# Patient Record
Sex: FEMALE | Race: WHITE | Employment: OTHER | URBAN - METROPOLITAN AREA
[De-identification: names, ages, dates, MRNs, and addresses within clinical notes are randomized per-mention and may not be internally consistent; named-entity substitution may affect disease eponyms.]

---

## 2017-01-17 RX ORDER — INSULIN DETEMIR 100 [IU]/ML
INJECTION, SOLUTION SUBCUTANEOUS
Qty: 45 ML | Refills: 3 | Status: SHIPPED | OUTPATIENT
Start: 2017-01-17 | End: 2018-01-18 | Stop reason: SDUPTHER

## 2017-02-06 RX ORDER — METOPROLOL TARTRATE 50 MG/1
TABLET, FILM COATED ORAL
Qty: 180 TABLET | Refills: 1 | Status: SHIPPED | OUTPATIENT
Start: 2017-02-06 | End: 2017-07-10 | Stop reason: SDUPTHER

## 2017-03-03 ENCOUNTER — TELEPHONE (OUTPATIENT)
Dept: FAMILY MEDICINE CLINIC | Age: 72
End: 2017-03-03

## 2017-04-05 DIAGNOSIS — I10 ESSENTIAL HYPERTENSION: ICD-10-CM

## 2017-04-05 DIAGNOSIS — E78.5 HYPERLIPIDEMIA: ICD-10-CM

## 2017-04-05 LAB
A/G RATIO: 1.4 (ref 1.1–2.2)
ALBUMIN SERPL-MCNC: 4.1 G/DL (ref 3.4–5)
ALP BLD-CCNC: 68 U/L (ref 40–129)
ALT SERPL-CCNC: 15 U/L (ref 10–40)
ANION GAP SERPL CALCULATED.3IONS-SCNC: 15 MMOL/L (ref 3–16)
AST SERPL-CCNC: 19 U/L (ref 15–37)
BILIRUB SERPL-MCNC: 0.4 MG/DL (ref 0–1)
BUN BLDV-MCNC: 14 MG/DL (ref 7–20)
CALCIUM SERPL-MCNC: 9.4 MG/DL (ref 8.3–10.6)
CHLORIDE BLD-SCNC: 100 MMOL/L (ref 99–110)
CHOLESTEROL, TOTAL: 127 MG/DL (ref 0–199)
CO2: 27 MMOL/L (ref 21–32)
CREAT SERPL-MCNC: 0.7 MG/DL (ref 0.6–1.2)
CREATININE URINE: 84.6 MG/DL (ref 28–259)
GFR AFRICAN AMERICAN: >60
GFR NON-AFRICAN AMERICAN: >60
GLOBULIN: 3 G/DL
GLUCOSE BLD-MCNC: 103 MG/DL (ref 70–99)
HDLC SERPL-MCNC: 42 MG/DL (ref 40–60)
LDL CHOLESTEROL CALCULATED: 60 MG/DL
MICROALBUMIN UR-MCNC: <1.2 MG/DL
MICROALBUMIN/CREAT UR-RTO: NORMAL MG/G (ref 0–30)
POTASSIUM SERPL-SCNC: 4.1 MMOL/L (ref 3.5–5.1)
SODIUM BLD-SCNC: 142 MMOL/L (ref 136–145)
TOTAL PROTEIN: 7.1 G/DL (ref 6.4–8.2)
TRIGL SERPL-MCNC: 127 MG/DL (ref 0–150)
VLDLC SERPL CALC-MCNC: 25 MG/DL

## 2017-04-06 LAB
ESTIMATED AVERAGE GLUCOSE: 157.1 MG/DL
HBA1C MFR BLD: 7.1 %

## 2017-04-11 ENCOUNTER — OFFICE VISIT (OUTPATIENT)
Dept: FAMILY MEDICINE CLINIC | Age: 72
End: 2017-04-11

## 2017-04-11 VITALS
DIASTOLIC BLOOD PRESSURE: 78 MMHG | TEMPERATURE: 98 F | BODY MASS INDEX: 29.02 KG/M2 | SYSTOLIC BLOOD PRESSURE: 130 MMHG | HEIGHT: 64 IN | HEART RATE: 68 BPM | WEIGHT: 170 LBS

## 2017-04-11 DIAGNOSIS — I10 ESSENTIAL HYPERTENSION: ICD-10-CM

## 2017-04-11 DIAGNOSIS — E78.00 PURE HYPERCHOLESTEROLEMIA: ICD-10-CM

## 2017-04-11 DIAGNOSIS — Z78.0 POSTMENOPAUSAL: ICD-10-CM

## 2017-04-11 PROCEDURE — 3014F SCREEN MAMMO DOC REV: CPT | Performed by: FAMILY MEDICINE

## 2017-04-11 PROCEDURE — 1036F TOBACCO NON-USER: CPT | Performed by: FAMILY MEDICINE

## 2017-04-11 PROCEDURE — 3045F PR MOST RECENT HEMOGLOBIN A1C LEVEL 7.0-9.0%: CPT | Performed by: FAMILY MEDICINE

## 2017-04-11 PROCEDURE — 1090F PRES/ABSN URINE INCON ASSESS: CPT | Performed by: FAMILY MEDICINE

## 2017-04-11 PROCEDURE — 1123F ACP DISCUSS/DSCN MKR DOCD: CPT | Performed by: FAMILY MEDICINE

## 2017-04-11 PROCEDURE — G8420 CALC BMI NORM PARAMETERS: HCPCS | Performed by: FAMILY MEDICINE

## 2017-04-11 PROCEDURE — G8427 DOCREV CUR MEDS BY ELIG CLIN: HCPCS | Performed by: FAMILY MEDICINE

## 2017-04-11 PROCEDURE — 4040F PNEUMOC VAC/ADMIN/RCVD: CPT | Performed by: FAMILY MEDICINE

## 2017-04-11 PROCEDURE — 99214 OFFICE O/P EST MOD 30 MIN: CPT | Performed by: FAMILY MEDICINE

## 2017-04-11 PROCEDURE — 3017F COLORECTAL CA SCREEN DOC REV: CPT | Performed by: FAMILY MEDICINE

## 2017-04-11 PROCEDURE — G8399 PT W/DXA RESULTS DOCUMENT: HCPCS | Performed by: FAMILY MEDICINE

## 2017-04-11 ASSESSMENT — ENCOUNTER SYMPTOMS
VOMITING: 0
WHEEZING: 0
COLOR CHANGE: 0
DIARRHEA: 0
SINUS PRESSURE: 0
RHINORRHEA: 0
EYE REDNESS: 0
ABDOMINAL PAIN: 0
BLOOD IN STOOL: 0
EYE DISCHARGE: 0
COUGH: 0
CHEST TIGHTNESS: 0
CONSTIPATION: 0
SHORTNESS OF BREATH: 0
NAUSEA: 0
EYE PAIN: 0

## 2017-04-11 ASSESSMENT — PATIENT HEALTH QUESTIONNAIRE - PHQ9
1. LITTLE INTEREST OR PLEASURE IN DOING THINGS: 0
SUM OF ALL RESPONSES TO PHQ QUESTIONS 1-9: 0
2. FEELING DOWN, DEPRESSED OR HOPELESS: 0
SUM OF ALL RESPONSES TO PHQ9 QUESTIONS 1 & 2: 0

## 2017-06-16 ENCOUNTER — HOSPITAL ENCOUNTER (OUTPATIENT)
Dept: GENERAL RADIOLOGY | Age: 72
Discharge: HOME | End: 2017-06-16
Attending: FAMILY MEDICINE | Admitting: FAMILY MEDICINE

## 2017-06-16 DIAGNOSIS — Z78.0 ASYMPTOMATIC MENOPAUSAL STATE: ICD-10-CM

## 2017-06-16 DIAGNOSIS — Z78.0 POSTMENOPAUSAL: ICD-10-CM

## 2017-06-16 DIAGNOSIS — Z12.31 VISIT FOR SCREENING MAMMOGRAM: ICD-10-CM

## 2017-07-10 RX ORDER — METOPROLOL TARTRATE 50 MG/1
TABLET, FILM COATED ORAL
Qty: 180 TABLET | Refills: 1 | Status: SHIPPED | OUTPATIENT
Start: 2017-07-10 | End: 2018-01-05 | Stop reason: SDUPTHER

## 2017-10-04 RX ORDER — ATORVASTATIN CALCIUM 10 MG/1
TABLET, FILM COATED ORAL
Qty: 90 TABLET | Refills: 2 | Status: SHIPPED | OUTPATIENT
Start: 2017-10-04 | End: 2018-07-23 | Stop reason: SDUPTHER

## 2017-10-07 DIAGNOSIS — E78.00 PURE HYPERCHOLESTEROLEMIA: ICD-10-CM

## 2017-10-07 DIAGNOSIS — I10 ESSENTIAL HYPERTENSION: ICD-10-CM

## 2017-10-07 LAB
A/G RATIO: 1.4 (ref 1.1–2.2)
ALBUMIN SERPL-MCNC: 4.1 G/DL (ref 3.4–5)
ALP BLD-CCNC: 68 U/L (ref 40–129)
ALT SERPL-CCNC: 16 U/L (ref 10–40)
ANION GAP SERPL CALCULATED.3IONS-SCNC: 12 MMOL/L (ref 3–16)
AST SERPL-CCNC: 16 U/L (ref 15–37)
BILIRUB SERPL-MCNC: 0.5 MG/DL (ref 0–1)
BUN BLDV-MCNC: 18 MG/DL (ref 7–20)
CALCIUM SERPL-MCNC: 9.4 MG/DL (ref 8.3–10.6)
CHLORIDE BLD-SCNC: 102 MMOL/L (ref 99–110)
CHOLESTEROL, TOTAL: 129 MG/DL (ref 0–199)
CO2: 29 MMOL/L (ref 21–32)
CREAT SERPL-MCNC: 0.7 MG/DL (ref 0.6–1.2)
GFR AFRICAN AMERICAN: >60
GFR NON-AFRICAN AMERICAN: >60
GLOBULIN: 2.9 G/DL
GLUCOSE BLD-MCNC: 116 MG/DL (ref 70–99)
HDLC SERPL-MCNC: 41 MG/DL (ref 40–60)
LDL CHOLESTEROL CALCULATED: 60 MG/DL
POTASSIUM SERPL-SCNC: 4.4 MMOL/L (ref 3.5–5.1)
SODIUM BLD-SCNC: 143 MMOL/L (ref 136–145)
TOTAL PROTEIN: 7 G/DL (ref 6.4–8.2)
TRIGL SERPL-MCNC: 139 MG/DL (ref 0–150)
VLDLC SERPL CALC-MCNC: 28 MG/DL

## 2017-10-08 LAB
ESTIMATED AVERAGE GLUCOSE: 165.7 MG/DL
HBA1C MFR BLD: 7.4 %

## 2017-10-12 ENCOUNTER — OFFICE VISIT (OUTPATIENT)
Dept: FAMILY MEDICINE CLINIC | Age: 72
End: 2017-10-12

## 2017-10-12 VITALS
TEMPERATURE: 98.2 F | DIASTOLIC BLOOD PRESSURE: 78 MMHG | HEART RATE: 64 BPM | BODY MASS INDEX: 30.93 KG/M2 | HEIGHT: 64 IN | SYSTOLIC BLOOD PRESSURE: 148 MMHG | WEIGHT: 181.2 LBS

## 2017-10-12 DIAGNOSIS — I10 ESSENTIAL HYPERTENSION: ICD-10-CM

## 2017-10-12 DIAGNOSIS — Z23 NEED FOR INFLUENZA VACCINATION: ICD-10-CM

## 2017-10-12 DIAGNOSIS — E78.00 PURE HYPERCHOLESTEROLEMIA: ICD-10-CM

## 2017-10-12 PROCEDURE — 90662 IIV NO PRSV INCREASED AG IM: CPT | Performed by: FAMILY MEDICINE

## 2017-10-12 PROCEDURE — 1036F TOBACCO NON-USER: CPT | Performed by: FAMILY MEDICINE

## 2017-10-12 PROCEDURE — G0008 ADMIN INFLUENZA VIRUS VAC: HCPCS | Performed by: FAMILY MEDICINE

## 2017-10-12 PROCEDURE — 1123F ACP DISCUSS/DSCN MKR DOCD: CPT | Performed by: FAMILY MEDICINE

## 2017-10-12 PROCEDURE — G8399 PT W/DXA RESULTS DOCUMENT: HCPCS | Performed by: FAMILY MEDICINE

## 2017-10-12 PROCEDURE — G8427 DOCREV CUR MEDS BY ELIG CLIN: HCPCS | Performed by: FAMILY MEDICINE

## 2017-10-12 PROCEDURE — 4040F PNEUMOC VAC/ADMIN/RCVD: CPT | Performed by: FAMILY MEDICINE

## 2017-10-12 PROCEDURE — G8484 FLU IMMUNIZE NO ADMIN: HCPCS | Performed by: FAMILY MEDICINE

## 2017-10-12 PROCEDURE — 3046F HEMOGLOBIN A1C LEVEL >9.0%: CPT | Performed by: FAMILY MEDICINE

## 2017-10-12 PROCEDURE — 3014F SCREEN MAMMO DOC REV: CPT | Performed by: FAMILY MEDICINE

## 2017-10-12 PROCEDURE — 3017F COLORECTAL CA SCREEN DOC REV: CPT | Performed by: FAMILY MEDICINE

## 2017-10-12 PROCEDURE — G8417 CALC BMI ABV UP PARAM F/U: HCPCS | Performed by: FAMILY MEDICINE

## 2017-10-12 PROCEDURE — 99214 OFFICE O/P EST MOD 30 MIN: CPT | Performed by: FAMILY MEDICINE

## 2017-10-12 PROCEDURE — 1090F PRES/ABSN URINE INCON ASSESS: CPT | Performed by: FAMILY MEDICINE

## 2017-10-12 ASSESSMENT — ENCOUNTER SYMPTOMS
EYE PAIN: 0
CHEST TIGHTNESS: 0
ABDOMINAL PAIN: 0
SHORTNESS OF BREATH: 0
EYE REDNESS: 0
NAUSEA: 0
COUGH: 0
CONSTIPATION: 0
VOMITING: 0
SINUS PRESSURE: 0
WHEEZING: 0
DIARRHEA: 0
EYE DISCHARGE: 0
RHINORRHEA: 0
BLOOD IN STOOL: 0
COLOR CHANGE: 0

## 2017-10-12 NOTE — PATIENT INSTRUCTIONS
Please call your pharmacy if you need any refills of your medication(s). Please call our office at [phone] if you don't hear from us about your test results. Bring an accurate list of your medications with you at every appointment to ensure that we have the correct information. Our office hours are: Monday - Friday 7 am- 5 pm; Saturdays vary on doctor working.     Phone lines turn on at 8 am.

## 2017-10-12 NOTE — PROGRESS NOTES
Hypertension     Type II or unspecified type diabetes mellitus without mention of complication, not stated as uncontrolled      Past Surgical History:   Procedure Laterality Date    COLONOSCOPY      FINGER TRIGGER RELEASE      thumb right hand    HYSTERECTOMY      SKIN GRAFT       Family History   Problem Relation Age of Onset    Diabetes Mother     Heart Disease Mother     Heart Disease Father     Cancer Brother      prostate    Heart Disease Brother     Heart Disease Brother     Heart Disease Brother     Hypertension Other      Social History   Substance Use Topics    Smoking status: Former Smoker     Packs/day: 1.00     Years: 20.00     Types: Cigarettes     Start date: 1/1/1965     Quit date: 4/5/1985    Smokeless tobacco: Never Used    Alcohol use No     Vitals:    10/12/17 0852 10/12/17 0906   BP: (!) 148/82 (!) 148/78   Site: Left Arm Right Arm   Position: Sitting Sitting   Cuff Size: Medium Adult Medium Adult   Pulse: 64    Temp: 98.2 °F (36.8 °C)    TempSrc: Oral    Weight: 181 lb 3.2 oz (82.2 kg)    Height: 5' 4\" (1.626 m)      Body mass index is 31.1 kg/m².      Wt Readings from Last 3 Encounters:   10/12/17 181 lb 3.2 oz (82.2 kg)   06/06/17 169 lb (76.7 kg)   04/11/17 170 lb (77.1 kg)     BP Readings from Last 3 Encounters:   10/12/17 (!) 148/78   06/06/17 (!) 142/86   04/11/17 130/78      Lab Review   Orders Only on 10/07/2017   Component Date Value    Cholesterol, Total 10/07/2017 129     Triglycerides 10/07/2017 139     HDL 10/07/2017 41     LDL Calculated 10/07/2017 60     VLDL CHOLESTEROL CALCULA* 10/07/2017 28     Sodium 10/07/2017 143     Potassium 10/07/2017 4.4     Chloride 10/07/2017 102     CO2 10/07/2017 29     Anion Gap 10/07/2017 12     Glucose 10/07/2017 116*    BUN 10/07/2017 18     CREATININE 10/07/2017 0.7     GFR Non- 10/07/2017 >60     GFR  10/07/2017 >60     Calcium 10/07/2017 9.4     Total Protein 10/07/2017 7.0     Alb 10/07/2017 4.1     Albumin/Globulin Ratio 10/07/2017 1.4     Total Bilirubin 10/07/2017 0.5     Alkaline Phosphatase 10/07/2017 68     ALT 10/07/2017 16     AST 10/07/2017 16     Globulin 10/07/2017 2.9     Hemoglobin A1C 10/08/2017 7.4     eAG 10/08/2017 165.7        Review of Systems   Constitutional: Negative for chills, fatigue, fever and unexpected weight change. HENT: Negative for congestion, ear discharge, ear pain, hearing loss, postnasal drip, rhinorrhea, sinus pressure and tinnitus. Eyes: Negative for pain, discharge, redness and visual disturbance. Respiratory: Negative for cough, chest tightness, shortness of breath and wheezing. Cardiovascular: Negative for chest pain and palpitations. Gastrointestinal: Negative for abdominal pain, blood in stool, constipation, diarrhea, nausea and vomiting. Genitourinary: Negative for difficulty urinating, dysuria and hematuria. Musculoskeletal: Negative for arthralgias and myalgias. Skin: Negative for color change and rash. Neurological: Negative for dizziness, seizures, syncope and headaches. Psychiatric/Behavioral: Negative for dysphoric mood and sleep disturbance. The patient is not nervous/anxious. Objective:   Physical Exam   Constitutional: She is oriented to person, place, and time. She appears well-developed and well-nourished. No distress. HENT:   Head: Normocephalic. Mouth/Throat: Oropharynx is clear and moist. No oropharyngeal exudate. Eyes: Conjunctivae and EOM are normal. No scleral icterus. Neck: Neck supple. No thyromegaly present. Cardiovascular: Normal rate, regular rhythm, normal heart sounds and intact distal pulses. No murmur heard. Pulmonary/Chest: Effort normal and breath sounds normal. She has no wheezes. Abdominal: Soft. Bowel sounds are normal. She exhibits no distension. There is no tenderness. There is no rebound and no guarding. Musculoskeletal: Normal range of motion.  She exhibits

## 2017-12-11 RX ORDER — LOSARTAN POTASSIUM 25 MG/1
TABLET ORAL
Qty: 135 TABLET | Refills: 3 | Status: SHIPPED | OUTPATIENT
Start: 2017-12-11 | End: 2018-07-23 | Stop reason: SDUPTHER

## 2018-01-09 RX ORDER — METOPROLOL TARTRATE 50 MG/1
TABLET, FILM COATED ORAL
Qty: 180 TABLET | Refills: 1 | Status: SHIPPED | OUTPATIENT
Start: 2018-01-09 | End: 2018-07-23 | Stop reason: SDUPTHER

## 2018-01-18 RX ORDER — INSULIN DETEMIR 100 [IU]/ML
INJECTION, SOLUTION SUBCUTANEOUS
Qty: 45 ML | Refills: 3 | Status: SHIPPED | OUTPATIENT
Start: 2018-01-18 | End: 2018-10-23 | Stop reason: SDUPTHER

## 2018-04-10 ENCOUNTER — OFFICE VISIT (OUTPATIENT)
Dept: FAMILY MEDICINE CLINIC | Age: 73
End: 2018-04-10

## 2018-04-10 VITALS
OXYGEN SATURATION: 95 % | SYSTOLIC BLOOD PRESSURE: 138 MMHG | DIASTOLIC BLOOD PRESSURE: 68 MMHG | BODY MASS INDEX: 32.61 KG/M2 | RESPIRATION RATE: 16 BRPM | HEIGHT: 64 IN | HEART RATE: 70 BPM | WEIGHT: 191 LBS

## 2018-04-10 DIAGNOSIS — Z85.42 HISTORY OF UTERINE CANCER: ICD-10-CM

## 2018-04-10 DIAGNOSIS — I10 ESSENTIAL HYPERTENSION: ICD-10-CM

## 2018-04-10 DIAGNOSIS — R06.09 DOE (DYSPNEA ON EXERTION): ICD-10-CM

## 2018-04-10 DIAGNOSIS — E78.00 PURE HYPERCHOLESTEROLEMIA: ICD-10-CM

## 2018-04-10 DIAGNOSIS — R06.09 DYSPNEA ON EXERTION: ICD-10-CM

## 2018-04-10 DIAGNOSIS — Z86.010 HISTORY OF COLONIC POLYPS: ICD-10-CM

## 2018-04-10 PROBLEM — Z86.0100 HISTORY OF COLONIC POLYPS: Status: ACTIVE | Noted: 2018-04-10

## 2018-04-10 LAB
ANION GAP SERPL CALCULATED.3IONS-SCNC: 16 MMOL/L (ref 3–16)
BUN BLDV-MCNC: 22 MG/DL (ref 7–20)
CALCIUM SERPL-MCNC: 9.7 MG/DL (ref 8.3–10.6)
CHLORIDE BLD-SCNC: 98 MMOL/L (ref 99–110)
CO2: 25 MMOL/L (ref 21–32)
CREAT SERPL-MCNC: 0.7 MG/DL (ref 0.6–1.2)
CREATININE URINE: 105 MG/DL (ref 28–259)
GFR AFRICAN AMERICAN: >60
GFR NON-AFRICAN AMERICAN: >60
GLUCOSE BLD-MCNC: 148 MG/DL (ref 70–99)
HBA1C MFR BLD: 9.4 %
MICROALBUMIN UR-MCNC: <1.2 MG/DL
MICROALBUMIN/CREAT UR-RTO: NORMAL MG/G (ref 0–30)
POTASSIUM SERPL-SCNC: 4.8 MMOL/L (ref 3.5–5.1)
SODIUM BLD-SCNC: 139 MMOL/L (ref 136–145)

## 2018-04-10 PROCEDURE — 3046F HEMOGLOBIN A1C LEVEL >9.0%: CPT | Performed by: INTERNAL MEDICINE

## 2018-04-10 PROCEDURE — G8417 CALC BMI ABV UP PARAM F/U: HCPCS | Performed by: INTERNAL MEDICINE

## 2018-04-10 PROCEDURE — 83036 HEMOGLOBIN GLYCOSYLATED A1C: CPT | Performed by: INTERNAL MEDICINE

## 2018-04-10 PROCEDURE — G8399 PT W/DXA RESULTS DOCUMENT: HCPCS | Performed by: INTERNAL MEDICINE

## 2018-04-10 PROCEDURE — 36415 COLL VENOUS BLD VENIPUNCTURE: CPT | Performed by: INTERNAL MEDICINE

## 2018-04-10 PROCEDURE — 1036F TOBACCO NON-USER: CPT | Performed by: INTERNAL MEDICINE

## 2018-04-10 PROCEDURE — 1090F PRES/ABSN URINE INCON ASSESS: CPT | Performed by: INTERNAL MEDICINE

## 2018-04-10 PROCEDURE — 4040F PNEUMOC VAC/ADMIN/RCVD: CPT | Performed by: INTERNAL MEDICINE

## 2018-04-10 PROCEDURE — 3017F COLORECTAL CA SCREEN DOC REV: CPT | Performed by: INTERNAL MEDICINE

## 2018-04-10 PROCEDURE — 3014F SCREEN MAMMO DOC REV: CPT | Performed by: INTERNAL MEDICINE

## 2018-04-10 PROCEDURE — G8427 DOCREV CUR MEDS BY ELIG CLIN: HCPCS | Performed by: INTERNAL MEDICINE

## 2018-04-10 PROCEDURE — 1123F ACP DISCUSS/DSCN MKR DOCD: CPT | Performed by: INTERNAL MEDICINE

## 2018-04-10 PROCEDURE — 99214 OFFICE O/P EST MOD 30 MIN: CPT | Performed by: INTERNAL MEDICINE

## 2018-04-10 RX ORDER — GLIPIZIDE 10 MG/1
TABLET, FILM COATED, EXTENDED RELEASE ORAL 2 TIMES DAILY
COMMUNITY
Start: 2016-09-26 | End: 2018-07-23 | Stop reason: SDUPTHER

## 2018-04-10 RX ORDER — GLUCOSAMINE HCL/CHONDROITIN SU 500-400 MG
CAPSULE ORAL
Qty: 100 STRIP | Refills: 3 | Status: SHIPPED | OUTPATIENT
Start: 2018-04-10 | End: 2018-04-12 | Stop reason: SDUPTHER

## 2018-04-10 RX ORDER — LANCETS
EACH MISCELLANEOUS
Qty: 100 EACH | Refills: 3 | Status: SHIPPED | OUTPATIENT
Start: 2018-04-10

## 2018-04-12 ENCOUNTER — TELEPHONE (OUTPATIENT)
Dept: FAMILY MEDICINE CLINIC | Age: 73
End: 2018-04-12

## 2018-04-12 RX ORDER — GLUCOSAMINE HCL/CHONDROITIN SU 500-400 MG
CAPSULE ORAL
Qty: 100 STRIP | Refills: 3 | Status: SHIPPED | OUTPATIENT
Start: 2018-04-12 | End: 2018-04-16 | Stop reason: SDUPTHER

## 2018-04-12 RX ORDER — LANCETS 30 GAUGE
1 EACH MISCELLANEOUS DAILY
Qty: 100 EACH | Refills: 3 | Status: SHIPPED | OUTPATIENT
Start: 2018-04-12 | End: 2018-04-18 | Stop reason: SDUPTHER

## 2018-04-16 ENCOUNTER — TELEPHONE (OUTPATIENT)
Dept: FAMILY MEDICINE CLINIC | Age: 73
End: 2018-04-16

## 2018-04-16 RX ORDER — GLUCOSAMINE HCL/CHONDROITIN SU 500-400 MG
CAPSULE ORAL
Qty: 100 STRIP | Refills: 3 | Status: SHIPPED | OUTPATIENT
Start: 2018-04-16 | End: 2018-04-18 | Stop reason: SDUPTHER

## 2018-04-18 ENCOUNTER — TELEPHONE (OUTPATIENT)
Dept: FAMILY MEDICINE CLINIC | Age: 73
End: 2018-04-18

## 2018-04-18 RX ORDER — LANCETS 30 GAUGE
1 EACH MISCELLANEOUS DAILY
Qty: 100 EACH | Refills: 3 | Status: SHIPPED | OUTPATIENT
Start: 2018-04-18

## 2018-04-18 RX ORDER — GLUCOSAMINE HCL/CHONDROITIN SU 500-400 MG
CAPSULE ORAL
Qty: 100 STRIP | Refills: 3 | Status: SHIPPED | OUTPATIENT
Start: 2018-04-18 | End: 2019-10-29 | Stop reason: SDUPTHER

## 2018-05-23 ENCOUNTER — OFFICE VISIT (OUTPATIENT)
Dept: FAMILY MEDICINE CLINIC | Age: 73
End: 2018-05-23

## 2018-05-23 VITALS
RESPIRATION RATE: 16 BRPM | HEIGHT: 64 IN | OXYGEN SATURATION: 96 % | BODY MASS INDEX: 32.78 KG/M2 | WEIGHT: 192 LBS | DIASTOLIC BLOOD PRESSURE: 80 MMHG | SYSTOLIC BLOOD PRESSURE: 140 MMHG | HEART RATE: 62 BPM

## 2018-05-23 DIAGNOSIS — E78.00 PURE HYPERCHOLESTEROLEMIA: ICD-10-CM

## 2018-05-23 DIAGNOSIS — I10 ESSENTIAL HYPERTENSION: ICD-10-CM

## 2018-05-23 LAB — HBA1C MFR BLD: 8.2 %

## 2018-05-23 PROCEDURE — 1123F ACP DISCUSS/DSCN MKR DOCD: CPT | Performed by: INTERNAL MEDICINE

## 2018-05-23 PROCEDURE — 2022F DILAT RTA XM EVC RTNOPTHY: CPT | Performed by: INTERNAL MEDICINE

## 2018-05-23 PROCEDURE — 4040F PNEUMOC VAC/ADMIN/RCVD: CPT | Performed by: INTERNAL MEDICINE

## 2018-05-23 PROCEDURE — 1036F TOBACCO NON-USER: CPT | Performed by: INTERNAL MEDICINE

## 2018-05-23 PROCEDURE — G8427 DOCREV CUR MEDS BY ELIG CLIN: HCPCS | Performed by: INTERNAL MEDICINE

## 2018-05-23 PROCEDURE — 3288F FALL RISK ASSESSMENT DOCD: CPT | Performed by: INTERNAL MEDICINE

## 2018-05-23 PROCEDURE — 3017F COLORECTAL CA SCREEN DOC REV: CPT | Performed by: INTERNAL MEDICINE

## 2018-05-23 PROCEDURE — G8417 CALC BMI ABV UP PARAM F/U: HCPCS | Performed by: INTERNAL MEDICINE

## 2018-05-23 PROCEDURE — 83036 HEMOGLOBIN GLYCOSYLATED A1C: CPT | Performed by: INTERNAL MEDICINE

## 2018-05-23 PROCEDURE — 1090F PRES/ABSN URINE INCON ASSESS: CPT | Performed by: INTERNAL MEDICINE

## 2018-05-23 PROCEDURE — G8399 PT W/DXA RESULTS DOCUMENT: HCPCS | Performed by: INTERNAL MEDICINE

## 2018-05-23 PROCEDURE — 99213 OFFICE O/P EST LOW 20 MIN: CPT | Performed by: INTERNAL MEDICINE

## 2018-05-23 PROCEDURE — 3045F PR MOST RECENT HEMOGLOBIN A1C LEVEL 7.0-9.0%: CPT | Performed by: INTERNAL MEDICINE

## 2018-05-23 PROCEDURE — G8510 SCR DEP NEG, NO PLAN REQD: HCPCS | Performed by: INTERNAL MEDICINE

## 2018-05-23 ASSESSMENT — PATIENT HEALTH QUESTIONNAIRE - PHQ9
2. FEELING DOWN, DEPRESSED OR HOPELESS: 0
SUM OF ALL RESPONSES TO PHQ9 QUESTIONS 1 & 2: 0
1. LITTLE INTEREST OR PLEASURE IN DOING THINGS: 0
SUM OF ALL RESPONSES TO PHQ QUESTIONS 1-9: 0

## 2018-07-09 ENCOUNTER — HOSPITAL ENCOUNTER (OUTPATIENT)
Dept: MAMMOGRAPHY | Age: 73
Discharge: HOME | End: 2018-07-09
Attending: INTERNAL MEDICINE | Admitting: INTERNAL MEDICINE

## 2018-07-09 DIAGNOSIS — Z78.0 ASYMPTOMATIC MENOPAUSAL STATE: ICD-10-CM

## 2018-07-09 DIAGNOSIS — Z12.31 ENCOUNTER FOR SCREENING MAMMOGRAM FOR BREAST CANCER: ICD-10-CM

## 2018-07-23 ENCOUNTER — OFFICE VISIT (OUTPATIENT)
Dept: FAMILY MEDICINE CLINIC | Age: 73
End: 2018-07-23

## 2018-07-23 VITALS
WEIGHT: 191 LBS | DIASTOLIC BLOOD PRESSURE: 78 MMHG | HEART RATE: 66 BPM | HEIGHT: 64 IN | BODY MASS INDEX: 32.61 KG/M2 | SYSTOLIC BLOOD PRESSURE: 145 MMHG

## 2018-07-23 DIAGNOSIS — I10 ESSENTIAL HYPERTENSION: ICD-10-CM

## 2018-07-23 DIAGNOSIS — R94.2 ABNORMAL PFT: ICD-10-CM

## 2018-07-23 LAB — HBA1C MFR BLD: 7.5 %

## 2018-07-23 PROCEDURE — 1090F PRES/ABSN URINE INCON ASSESS: CPT | Performed by: INTERNAL MEDICINE

## 2018-07-23 PROCEDURE — 3017F COLORECTAL CA SCREEN DOC REV: CPT | Performed by: INTERNAL MEDICINE

## 2018-07-23 PROCEDURE — G8399 PT W/DXA RESULTS DOCUMENT: HCPCS | Performed by: INTERNAL MEDICINE

## 2018-07-23 PROCEDURE — 83036 HEMOGLOBIN GLYCOSYLATED A1C: CPT | Performed by: INTERNAL MEDICINE

## 2018-07-23 PROCEDURE — G8417 CALC BMI ABV UP PARAM F/U: HCPCS | Performed by: INTERNAL MEDICINE

## 2018-07-23 PROCEDURE — 2022F DILAT RTA XM EVC RTNOPTHY: CPT | Performed by: INTERNAL MEDICINE

## 2018-07-23 PROCEDURE — 3045F PR MOST RECENT HEMOGLOBIN A1C LEVEL 7.0-9.0%: CPT | Performed by: INTERNAL MEDICINE

## 2018-07-23 PROCEDURE — 4040F PNEUMOC VAC/ADMIN/RCVD: CPT | Performed by: INTERNAL MEDICINE

## 2018-07-23 PROCEDURE — 99213 OFFICE O/P EST LOW 20 MIN: CPT | Performed by: INTERNAL MEDICINE

## 2018-07-23 PROCEDURE — 1101F PT FALLS ASSESS-DOCD LE1/YR: CPT | Performed by: INTERNAL MEDICINE

## 2018-07-23 PROCEDURE — 1123F ACP DISCUSS/DSCN MKR DOCD: CPT | Performed by: INTERNAL MEDICINE

## 2018-07-23 PROCEDURE — G8427 DOCREV CUR MEDS BY ELIG CLIN: HCPCS | Performed by: INTERNAL MEDICINE

## 2018-07-23 PROCEDURE — 1036F TOBACCO NON-USER: CPT | Performed by: INTERNAL MEDICINE

## 2018-07-23 RX ORDER — LOSARTAN POTASSIUM 25 MG/1
TABLET ORAL
Qty: 180 TABLET | Refills: 1 | Status: SHIPPED | OUTPATIENT
Start: 2018-07-23 | End: 2018-10-23 | Stop reason: SDUPTHER

## 2018-07-23 RX ORDER — METOPROLOL TARTRATE 50 MG/1
TABLET, FILM COATED ORAL
Qty: 180 TABLET | Refills: 1 | Status: SHIPPED | OUTPATIENT
Start: 2018-07-23 | End: 2018-10-23 | Stop reason: SDUPTHER

## 2018-07-23 RX ORDER — ATORVASTATIN CALCIUM 10 MG/1
TABLET, FILM COATED ORAL
Qty: 90 TABLET | Refills: 1 | Status: SHIPPED | OUTPATIENT
Start: 2018-07-23 | End: 2018-10-23 | Stop reason: SDUPTHER

## 2018-07-23 RX ORDER — GLIPIZIDE 10 MG/1
TABLET, FILM COATED, EXTENDED RELEASE ORAL
Qty: 180 TABLET | Refills: 1 | Status: SHIPPED | OUTPATIENT
Start: 2018-07-23 | End: 2018-10-23 | Stop reason: SDUPTHER
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
## 2018-10-23 ENCOUNTER — OFFICE VISIT (OUTPATIENT)
Dept: FAMILY MEDICINE CLINIC | Age: 73
End: 2018-10-23
Payer: MEDICARE

## 2018-10-23 VITALS
OXYGEN SATURATION: 94 % | BODY MASS INDEX: 33.12 KG/M2 | HEIGHT: 64 IN | DIASTOLIC BLOOD PRESSURE: 76 MMHG | SYSTOLIC BLOOD PRESSURE: 130 MMHG | HEART RATE: 70 BPM | RESPIRATION RATE: 16 BRPM | WEIGHT: 194 LBS

## 2018-10-23 DIAGNOSIS — Z85.42 HISTORY OF UTERINE CANCER: ICD-10-CM

## 2018-10-23 DIAGNOSIS — Z23 NEED FOR INFLUENZA VACCINATION: ICD-10-CM

## 2018-10-23 DIAGNOSIS — E78.00 PURE HYPERCHOLESTEROLEMIA: ICD-10-CM

## 2018-10-23 DIAGNOSIS — E66.9 OBESITY, CLASS I, BMI 30-34.9: ICD-10-CM

## 2018-10-23 DIAGNOSIS — Z86.010 HISTORY OF COLONIC POLYPS: ICD-10-CM

## 2018-10-23 DIAGNOSIS — Z92.3 HISTORY OF RADIATION THERAPY: ICD-10-CM

## 2018-10-23 DIAGNOSIS — E11.65 UNCONTROLLED TYPE 2 DIABETES MELLITUS WITH HYPERGLYCEMIA (HCC): Primary | ICD-10-CM

## 2018-10-23 DIAGNOSIS — I10 ESSENTIAL HYPERTENSION: ICD-10-CM

## 2018-10-23 PROBLEM — E66.811 OBESITY, CLASS I, BMI 30-34.9: Status: ACTIVE | Noted: 2018-10-23

## 2018-10-23 LAB
ALBUMIN SERPL-MCNC: 4.3 G/DL (ref 3.4–5)
ALP BLD-CCNC: 71 U/L (ref 40–129)
ALT SERPL-CCNC: 35 U/L (ref 10–40)
ANION GAP SERPL CALCULATED.3IONS-SCNC: 21 MMOL/L (ref 3–16)
AST SERPL-CCNC: 45 U/L (ref 15–37)
BILIRUB SERPL-MCNC: 0.6 MG/DL (ref 0–1)
BILIRUBIN DIRECT: <0.2 MG/DL (ref 0–0.3)
BILIRUBIN, INDIRECT: ABNORMAL MG/DL (ref 0–1)
BUN BLDV-MCNC: 14 MG/DL (ref 7–20)
CALCIUM SERPL-MCNC: 9.3 MG/DL (ref 8.3–10.6)
CHLORIDE BLD-SCNC: 101 MMOL/L (ref 99–110)
CHOLESTEROL, TOTAL: 126 MG/DL (ref 0–199)
CO2: 20 MMOL/L (ref 21–32)
CREAT SERPL-MCNC: 0.8 MG/DL (ref 0.6–1.2)
GFR AFRICAN AMERICAN: >60
GFR NON-AFRICAN AMERICAN: >60
GLUCOSE BLD-MCNC: 136 MG/DL (ref 70–99)
HBA1C MFR BLD: 7.9 %
HDLC SERPL-MCNC: 35 MG/DL (ref 40–60)
LDL CHOLESTEROL CALCULATED: 52 MG/DL
POTASSIUM SERPL-SCNC: 4.3 MMOL/L (ref 3.5–5.1)
SODIUM BLD-SCNC: 142 MMOL/L (ref 136–145)
TOTAL PROTEIN: 7.5 G/DL (ref 6.4–8.2)
TRIGL SERPL-MCNC: 196 MG/DL (ref 0–150)
VLDLC SERPL CALC-MCNC: 39 MG/DL

## 2018-10-23 PROCEDURE — 1090F PRES/ABSN URINE INCON ASSESS: CPT | Performed by: INTERNAL MEDICINE

## 2018-10-23 PROCEDURE — G8399 PT W/DXA RESULTS DOCUMENT: HCPCS | Performed by: INTERNAL MEDICINE

## 2018-10-23 PROCEDURE — 1101F PT FALLS ASSESS-DOCD LE1/YR: CPT | Performed by: INTERNAL MEDICINE

## 2018-10-23 PROCEDURE — G0008 ADMIN INFLUENZA VIRUS VAC: HCPCS | Performed by: INTERNAL MEDICINE

## 2018-10-23 PROCEDURE — 3045F PR MOST RECENT HEMOGLOBIN A1C LEVEL 7.0-9.0%: CPT | Performed by: INTERNAL MEDICINE

## 2018-10-23 PROCEDURE — G8417 CALC BMI ABV UP PARAM F/U: HCPCS | Performed by: INTERNAL MEDICINE

## 2018-10-23 PROCEDURE — G8482 FLU IMMUNIZE ORDER/ADMIN: HCPCS | Performed by: INTERNAL MEDICINE

## 2018-10-23 PROCEDURE — 2022F DILAT RTA XM EVC RTNOPTHY: CPT | Performed by: INTERNAL MEDICINE

## 2018-10-23 PROCEDURE — 90682 RIV4 VACC RECOMBINANT DNA IM: CPT | Performed by: INTERNAL MEDICINE

## 2018-10-23 PROCEDURE — 3017F COLORECTAL CA SCREEN DOC REV: CPT | Performed by: INTERNAL MEDICINE

## 2018-10-23 PROCEDURE — 99214 OFFICE O/P EST MOD 30 MIN: CPT | Performed by: INTERNAL MEDICINE

## 2018-10-23 PROCEDURE — 83036 HEMOGLOBIN GLYCOSYLATED A1C: CPT | Performed by: INTERNAL MEDICINE

## 2018-10-23 PROCEDURE — 36415 COLL VENOUS BLD VENIPUNCTURE: CPT | Performed by: INTERNAL MEDICINE

## 2018-10-23 PROCEDURE — G8427 DOCREV CUR MEDS BY ELIG CLIN: HCPCS | Performed by: INTERNAL MEDICINE

## 2018-10-23 PROCEDURE — 4040F PNEUMOC VAC/ADMIN/RCVD: CPT | Performed by: INTERNAL MEDICINE

## 2018-10-23 PROCEDURE — 1036F TOBACCO NON-USER: CPT | Performed by: INTERNAL MEDICINE

## 2018-10-23 PROCEDURE — 1123F ACP DISCUSS/DSCN MKR DOCD: CPT | Performed by: INTERNAL MEDICINE

## 2018-10-23 RX ORDER — ATORVASTATIN CALCIUM 10 MG/1
TABLET, FILM COATED ORAL
Qty: 90 TABLET | Refills: 1 | Status: SHIPPED | OUTPATIENT
Start: 2018-10-23 | End: 2019-07-08 | Stop reason: SDUPTHER

## 2018-10-23 RX ORDER — ASPIRIN 81 MG/1
81 TABLET ORAL DAILY
Qty: 90 TABLET | Refills: 1 | Status: SHIPPED | OUTPATIENT
Start: 2018-10-23 | End: 2020-07-01 | Stop reason: SDUPTHER

## 2018-10-23 RX ORDER — LOSARTAN POTASSIUM 25 MG/1
TABLET ORAL
Qty: 180 TABLET | Refills: 1 | Status: SHIPPED | OUTPATIENT
Start: 2018-10-23 | End: 2019-04-11 | Stop reason: SDUPTHER

## 2018-10-23 RX ORDER — GLIPIZIDE 10 MG/1
TABLET, FILM COATED, EXTENDED RELEASE ORAL
Qty: 180 TABLET | Refills: 1 | Status: SHIPPED | OUTPATIENT
Start: 2018-10-23 | End: 2019-05-23

## 2018-10-23 RX ORDER — METOPROLOL TARTRATE 50 MG/1
TABLET, FILM COATED ORAL
Qty: 180 TABLET | Refills: 1 | Status: SHIPPED | OUTPATIENT
Start: 2018-10-23 | End: 2019-07-08 | Stop reason: SDUPTHER

## 2018-10-24 ENCOUNTER — TELEPHONE (OUTPATIENT)
Dept: FAMILY MEDICINE CLINIC | Age: 73
End: 2018-10-24

## 2018-10-30 ENCOUNTER — TELEPHONE (OUTPATIENT)
Dept: FAMILY MEDICINE CLINIC | Age: 73
End: 2018-10-30

## 2018-10-30 NOTE — TELEPHONE ENCOUNTER
Pt called in stating that she was supposed to come in for a blood pressure check however she states that her blood pressure has been better and at the most recent reading it was 124/69. Best call back number for pt: [phone].

## 2019-04-11 RX ORDER — INSULIN DETEMIR 100 [IU]/ML
INJECTION, SOLUTION SUBCUTANEOUS
Qty: 45 ML | Refills: 3 | Status: SHIPPED | OUTPATIENT
Start: 2019-04-11 | End: 2019-07-23

## 2019-04-11 RX ORDER — LOSARTAN POTASSIUM 25 MG/1
TABLET ORAL
Qty: 180 TABLET | Refills: 1 | Status: SHIPPED | OUTPATIENT
Start: 2019-04-11 | End: 2019-09-24 | Stop reason: SDUPTHER

## 2019-04-23 ENCOUNTER — OFFICE VISIT (OUTPATIENT)
Dept: FAMILY MEDICINE CLINIC | Age: 74
End: 2019-04-23
Payer: MEDICARE

## 2019-04-23 VITALS
HEART RATE: 67 BPM | RESPIRATION RATE: 12 BRPM | WEIGHT: 193 LBS | SYSTOLIC BLOOD PRESSURE: 137 MMHG | OXYGEN SATURATION: 95 % | HEIGHT: 64 IN | DIASTOLIC BLOOD PRESSURE: 77 MMHG | BODY MASS INDEX: 32.95 KG/M2

## 2019-04-23 DIAGNOSIS — Z86.010 HISTORY OF COLONIC POLYPS: ICD-10-CM

## 2019-04-23 DIAGNOSIS — Z92.3 HISTORY OF RADIATION THERAPY: ICD-10-CM

## 2019-04-23 DIAGNOSIS — E11.65 UNCONTROLLED TYPE 2 DIABETES MELLITUS WITH HYPERGLYCEMIA (HCC): Primary | ICD-10-CM

## 2019-04-23 DIAGNOSIS — E78.00 PURE HYPERCHOLESTEROLEMIA: ICD-10-CM

## 2019-04-23 DIAGNOSIS — I10 ESSENTIAL HYPERTENSION: ICD-10-CM

## 2019-04-23 DIAGNOSIS — Z85.42 HISTORY OF UTERINE CANCER: ICD-10-CM

## 2019-04-23 LAB
ANION GAP SERPL CALCULATED.3IONS-SCNC: 20 MMOL/L (ref 3–16)
BUN BLDV-MCNC: 19 MG/DL (ref 7–20)
CALCIUM SERPL-MCNC: 9.8 MG/DL (ref 8.3–10.6)
CHLORIDE BLD-SCNC: 101 MMOL/L (ref 99–110)
CO2: 21 MMOL/L (ref 21–32)
CREAT SERPL-MCNC: 0.9 MG/DL (ref 0.6–1.2)
CREATININE URINE: 226.4 MG/DL (ref 28–259)
GFR AFRICAN AMERICAN: >60
GFR NON-AFRICAN AMERICAN: >60
GLUCOSE BLD-MCNC: 171 MG/DL (ref 70–99)
HBA1C MFR BLD: 8.5 %
MICROALBUMIN UR-MCNC: <1.2 MG/DL
MICROALBUMIN/CREAT UR-RTO: NORMAL MG/G (ref 0–30)
POTASSIUM SERPL-SCNC: 5 MMOL/L (ref 3.5–5.1)
SODIUM BLD-SCNC: 142 MMOL/L (ref 136–145)

## 2019-04-23 PROCEDURE — 3045F PR MOST RECENT HEMOGLOBIN A1C LEVEL 7.0-9.0%: CPT | Performed by: INTERNAL MEDICINE

## 2019-04-23 PROCEDURE — 3017F COLORECTAL CA SCREEN DOC REV: CPT | Performed by: INTERNAL MEDICINE

## 2019-04-23 PROCEDURE — 90732 PPSV23 VACC 2 YRS+ SUBQ/IM: CPT | Performed by: INTERNAL MEDICINE

## 2019-04-23 PROCEDURE — 36415 COLL VENOUS BLD VENIPUNCTURE: CPT | Performed by: INTERNAL MEDICINE

## 2019-04-23 PROCEDURE — G0009 ADMIN PNEUMOCOCCAL VACCINE: HCPCS | Performed by: INTERNAL MEDICINE

## 2019-04-23 PROCEDURE — 4040F PNEUMOC VAC/ADMIN/RCVD: CPT | Performed by: INTERNAL MEDICINE

## 2019-04-23 PROCEDURE — 99213 OFFICE O/P EST LOW 20 MIN: CPT | Performed by: INTERNAL MEDICINE

## 2019-04-23 PROCEDURE — G8399 PT W/DXA RESULTS DOCUMENT: HCPCS | Performed by: INTERNAL MEDICINE

## 2019-04-23 PROCEDURE — G8417 CALC BMI ABV UP PARAM F/U: HCPCS | Performed by: INTERNAL MEDICINE

## 2019-04-23 PROCEDURE — G8427 DOCREV CUR MEDS BY ELIG CLIN: HCPCS | Performed by: INTERNAL MEDICINE

## 2019-04-23 PROCEDURE — 1036F TOBACCO NON-USER: CPT | Performed by: INTERNAL MEDICINE

## 2019-04-23 PROCEDURE — 2022F DILAT RTA XM EVC RTNOPTHY: CPT | Performed by: INTERNAL MEDICINE

## 2019-04-23 PROCEDURE — 1090F PRES/ABSN URINE INCON ASSESS: CPT | Performed by: INTERNAL MEDICINE

## 2019-04-23 PROCEDURE — 83036 HEMOGLOBIN GLYCOSYLATED A1C: CPT | Performed by: INTERNAL MEDICINE

## 2019-04-23 PROCEDURE — 1123F ACP DISCUSS/DSCN MKR DOCD: CPT | Performed by: INTERNAL MEDICINE

## 2019-04-23 ASSESSMENT — PATIENT HEALTH QUESTIONNAIRE - PHQ9
SUM OF ALL RESPONSES TO PHQ QUESTIONS 1-9: 0
2. FEELING DOWN, DEPRESSED OR HOPELESS: 0
1. LITTLE INTEREST OR PLEASURE IN DOING THINGS: 0
SUM OF ALL RESPONSES TO PHQ9 QUESTIONS 1 & 2: 0
SUM OF ALL RESPONSES TO PHQ QUESTIONS 1-9: 0

## 2019-04-23 NOTE — LETTER
[address] [facility] [address] [address]  Phone: [phone]  Fax: [phone]    Opal Pena MD        April 24, 2019    [address] [facility] [address] [facility] [address]      Dear Mary Spotted:    I'm sorry I am unable to reach you. Your urine test is normal, good. Sugar is up a bit as we knew. Were you able to get your new medication? If too expensive, we can switch glipizide to short acting before meals. If you have any questions or concerns, please don't hesitate to call.     Sincerely,        Opal Pena MD

## 2019-04-23 NOTE — PATIENT INSTRUCTIONS
Stop glipizide and start new med. One a day x 2 weeks then twice daily.  Decrease calories and find an exercise you can do  Follow up with sugars in about 1 month with sugar log  If sugars consistently above 160 can increase insulin by 2 units  Schedule [facility] eye exam

## 2019-04-23 NOTE — PROGRESS NOTES
HPI: Yue Roy presents for evaluation and management of diabetes, obesity, hypertension, .     Chronic health issues include obesity, hypertension, history uterine cancer, diabetes     Uncontrolled DM 2. \"trying to follow diet\"  Walking and pedaling daily until back from [facility] April 2.  . States she is compliant with her medicines and trying to eat well. Doesn't have a explanation for her sugars being higher. Did have one episode of low sugar in winter. States sugars currently can be from 122- 217. No proteinuria. Losartan with blood pressures in the 140 range at home. Is up-to-date with pneumonia vaccine. No retinopathy 9.2018. Nocturia 1-2. No falls. No paresthesias.     Hypertension stress echo negative in 2013. Ejection fraction 55% with left ventricular hypertrophy. Pulmonary function with restrictive disease and CT with interstitial lung disease in the dependent portions. Negative laboratories for connective tissue disease. Swallow without aspiration. Denies sleep apnea. No increased shortness of breath or cough. No inhalers. Breathing is stable       uterine cancer. Total hysterectomy and radiation therapy follows up yearly with oncology.     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 Follows yearly. Positive callus     Colonic polyp. Recheck 2020. No rectal bleeding or change in stools.     Stable shortness of breath. No change. History restrictive lung disease. Obesity. No evidence of aspiration. No medication at this time.        Specialist  [facility]    Podiatrist    Oncologist.         PMH:    Total hyst for cancer     Partial amputation foot     SH: no tobacco. No alcohol.  33 years. Retired [employer] ran [employer]. Travels to [facility] late October till April. [address], fishing, gardening.     FH: 7 brothers 2 sisters    + brother colon cancer 79,     - DM, CAD      review of systems: Up-to-date with eye exams. Denies any hearing loss or falls. No sleep apnea. No current sinus symptoms. Denies any wheezing pneumonias. Does have shortness of breath with exertion and findings noted above. No chest pain palpitations. Blood pressure been good. Rare GE reflux. Occasional constipation. History of proteinuria. Occasionally urine fields and smells strong. No vaginal bleeding. Denies any joint issues other than missing foot. No falls. Has some difficulty walking secondary to discomfort.  Weight is up      Constitutional, ent, CV, respiratory, GI, , joint, skin, allergic and psychiatric ROS reviewed and negative except for above    No Known Allergies    Outpatient Medications Marked as Taking for the 4/23/19 encounter (Office Visit) with Abigail Barros MD   Medication Sig Dispense Refill    losartan (COZAAR) 25 MG tablet TAKE 1 TABLET TWICE A DAY  FOR BLOOD PRESSURE 180 tablet 1    LEVEMIR FLEXTOUCH 100 UNIT/ML injection pen INJECT 40 UNITS INTO THE   SKIN NIGHTLY 45 mL 3    glipiZIDE (GLUCOTROL XL) 10 MG extended release tablet 1 po bid 180 tablet 1    metoprolol tartrate (LOPRESSOR) 50 MG tablet TAKE 1 TABLET TWICE A  tablet 1    atorvastatin (LIPITOR) 10 MG tablet TAKE 1 TABLET DAILY 90 tablet 1    metFORMIN (GLUCOPHAGE) 850 MG tablet TAKE 1 TABLET TWICE A DAY  WITH MEALS 180 tablet 1    aspirin 81 MG EC tablet Take 1 tablet by mouth daily 90 tablet 1    Blood Glucose Monitoring Suppl BHANU Disp 1  Also lancets and strips for bid testing 100 RF x3 1 Device 0    Glucose Blood (BLOOD GLUCOSE TEST STRIPS) STRP Glucose test strip bid for apprpriate meter bid 100 strip 3    Lancets MISC 1 each by Does not apply route daily 100 each 3    Insulin Pen Needle 32G X 4 MM MISC 1 each by Does not apply route daily 100 each 11    Blood Glucose Monitoring Suppl BHANU Disp 1 for diabetes 1 Device 0    ACCU-CHEK SOFTCLIX LANCETS MISC Twice daily sugars check 100 each 3    Loratadine (CLARITIN PO) Take by mouth daily               Past Medical History:   Diagnosis Date   

## 2019-04-24 ENCOUNTER — TELEPHONE (OUTPATIENT)
Dept: FAMILY MEDICINE CLINIC | Age: 74
End: 2019-04-24

## 2019-04-24 NOTE — TELEPHONE ENCOUNTER
PT called in for recent labs, I gave all notes available. \"Urine test normal good. Sugar up a bit as we knew.  Able to get new medication?  If too expensive can switch glipizide to short acting before meals\"

## 2019-04-30 ENCOUNTER — TELEPHONE (OUTPATIENT)
Dept: FAMILY MEDICINE CLINIC | Age: 74
End: 2019-04-30

## 2019-04-30 NOTE — TELEPHONE ENCOUNTER
Received fax from pharmacy that insurance will not pay foro more than 1 tablet a day on the januvia. First few days of patient taking the Januvia sugars were in the 130's. She says they are now running in 160's. Patient understands she will increase to the 100 She would like the Januvia 100 sent to the mail order.

## 2019-05-13 DIAGNOSIS — E11.65 UNCONTROLLED TYPE 2 DIABETES MELLITUS WITH HYPERGLYCEMIA (HCC): Primary | ICD-10-CM

## 2019-05-15 ENCOUNTER — TELEPHONE (OUTPATIENT)
Dept: FAMILY MEDICINE CLINIC | Age: 74
End: 2019-05-15

## 2019-05-15 DIAGNOSIS — E11.65 UNCONTROLLED TYPE 2 DIABETES MELLITUS WITH HYPERGLYCEMIA (HCC): ICD-10-CM

## 2019-05-15 DIAGNOSIS — E11.65 UNCONTROLLED TYPE 2 DIABETES MELLITUS WITH HYPERGLYCEMIA (HCC): Primary | ICD-10-CM

## 2019-05-15 RX ORDER — PEN NEEDLE, DIABETIC 32GX 5/32"
NEEDLE, DISPOSABLE MISCELLANEOUS
Refills: 11 | OUTPATIENT
Start: 2019-05-15

## 2019-05-15 NOTE — TELEPHONE ENCOUNTER
Pt called in wanting to know where her insulin needles were sent. Informed Pt they were sent to [facility]. Pt stated that she would like for them to be sent to her local [facility] in [place].      Best call back number: [phone]

## 2019-05-16 RX ORDER — PEN NEEDLE, DIABETIC 32GX 5/32"
NEEDLE, DISPOSABLE MISCELLANEOUS
Refills: 11 | OUTPATIENT
Start: 2019-05-16

## 2019-05-23 ENCOUNTER — OFFICE VISIT (OUTPATIENT)
Dept: FAMILY MEDICINE CLINIC | Age: 74
End: 2019-05-23
Payer: MEDICARE

## 2019-05-23 VITALS
DIASTOLIC BLOOD PRESSURE: 79 MMHG | OXYGEN SATURATION: 96 % | RESPIRATION RATE: 12 BRPM | HEIGHT: 64 IN | SYSTOLIC BLOOD PRESSURE: 136 MMHG | HEART RATE: 64 BPM | BODY MASS INDEX: 33.12 KG/M2 | WEIGHT: 194 LBS

## 2019-05-23 DIAGNOSIS — E11.65 UNCONTROLLED TYPE 2 DIABETES MELLITUS WITH HYPERGLYCEMIA (HCC): Primary | ICD-10-CM

## 2019-05-23 LAB — HBA1C MFR BLD: 8.5 %

## 2019-05-23 PROCEDURE — G8427 DOCREV CUR MEDS BY ELIG CLIN: HCPCS | Performed by: INTERNAL MEDICINE

## 2019-05-23 PROCEDURE — G8417 CALC BMI ABV UP PARAM F/U: HCPCS | Performed by: INTERNAL MEDICINE

## 2019-05-23 PROCEDURE — G8399 PT W/DXA RESULTS DOCUMENT: HCPCS | Performed by: INTERNAL MEDICINE

## 2019-05-23 PROCEDURE — 83036 HEMOGLOBIN GLYCOSYLATED A1C: CPT | Performed by: INTERNAL MEDICINE

## 2019-05-23 PROCEDURE — 1123F ACP DISCUSS/DSCN MKR DOCD: CPT | Performed by: INTERNAL MEDICINE

## 2019-05-23 PROCEDURE — 99213 OFFICE O/P EST LOW 20 MIN: CPT | Performed by: INTERNAL MEDICINE

## 2019-05-23 PROCEDURE — 3017F COLORECTAL CA SCREEN DOC REV: CPT | Performed by: INTERNAL MEDICINE

## 2019-05-23 PROCEDURE — 1036F TOBACCO NON-USER: CPT | Performed by: INTERNAL MEDICINE

## 2019-05-23 PROCEDURE — 1090F PRES/ABSN URINE INCON ASSESS: CPT | Performed by: INTERNAL MEDICINE

## 2019-05-23 PROCEDURE — 3045F PR MOST RECENT HEMOGLOBIN A1C LEVEL 7.0-9.0%: CPT | Performed by: INTERNAL MEDICINE

## 2019-05-23 PROCEDURE — 2022F DILAT RTA XM EVC RTNOPTHY: CPT | Performed by: INTERNAL MEDICINE

## 2019-05-23 PROCEDURE — 4040F PNEUMOC VAC/ADMIN/RCVD: CPT | Performed by: INTERNAL MEDICINE

## 2019-05-23 NOTE — PROGRESS NOTES
 Heart Disease Father     Cancer Brother         prostate    Heart Disease Brother     Heart Disease Brother     Heart Disease Brother     Hypertension Other            Review of Systems      Objective     /79   Pulse 64   Resp 12   Ht 5' 4\" (1.626 m)   Wt 194 lb (88 kg)   SpO2 96% Comment: RA  BMI 33.30 kg/m²     @LASTSAO2(3)@    Wt Readings from Last 3 Encounters:   05/23/19 194 lb (88 kg)   04/23/19 193 lb (87.5 kg)   10/23/18 194 lb (88 kg)       Physical Exam     NAD alert and cooperative  HEENT: Throat is clear. Good upstroke of the carotids. Lungs are clear. Cardiovascular exam regular rate and rhythm.         Chemistry        Component Value Date/Time     04/23/2019 1119    K 5.0 04/23/2019 1119     04/23/2019 1119    CO2 21 04/23/2019 1119    BUN 19 04/23/2019 1119    CREATININE 0.9 04/23/2019 1119        Component Value Date/Time    CALCIUM 9.8 04/23/2019 1119    ALKPHOS 71 10/23/2018 1125    AST 45 (H) 10/23/2018 1125    ALT 35 10/23/2018 1125    BILITOT 0.6 10/23/2018 1125            Lab Results   Component Value Date    WBC 10.6 04/06/2013    HGB 12.1 04/06/2013    HCT 37.5 04/06/2013    MCV 87.4 04/06/2013     04/06/2013     Lab Results   Component Value Date    LABA1C 8.5 04/23/2019     Lab Results   Component Value Date    .7 10/07/2017     Lab Results   Component Value Date    LABA1C 8.5 04/23/2019     No components found for: CHLPL  Lab Results   Component Value Date    TRIG 196 (H) 10/23/2018    TRIG 139 10/07/2017    TRIG 127 04/05/2017     Lab Results   Component Value Date    HDL 35 (L) 10/23/2018    HDL 41 10/07/2017    HDL 42 04/05/2017     Lab Results   Component Value Date    LDLCALC 52 10/23/2018    LDLCALC 60 10/07/2017    LDLCALC 60 04/05/2017     Lab Results   Component Value Date    LABVLDL 39 10/23/2018    LABVLDL 28 10/07/2017    LABVLDL 25 04/05/2017       Old labs and records reviewed or requested  Discussed past lab and studies with patient      Diagnosis Orders   1. Uncontrolled type 2 diabetes mellitus with hyperglycemia (HCC)  POCT glycosylated hemoglobin (Hb A1C)     Visit from 1027 2/10/40    Review of sugar, diet, weight loss. Continue current medications. Will increase insulin to 42. Follow back up in 2 months. If not improved [place] or jardiance humolog and to a possibility. Advised on weight loss and we'll check on gym or silver sneakers. Return in about 2 months (around 7/23/2019). Diagnosis and treatment discussed.   Possible side effects of medication reviewed  Patients questions answered  Follow up understood  Pt aware if they are not contacted about any test results , this does not mean they are normal.  They should call

## 2019-07-08 RX ORDER — METOPROLOL TARTRATE 50 MG/1
TABLET, FILM COATED ORAL
Qty: 180 TABLET | Refills: 1 | Status: SHIPPED | OUTPATIENT
Start: 2019-07-08 | End: 2020-01-10 | Stop reason: SDUPTHER

## 2019-07-08 RX ORDER — ATORVASTATIN CALCIUM 10 MG/1
TABLET, FILM COATED ORAL
Qty: 90 TABLET | Refills: 1 | Status: SHIPPED | OUTPATIENT
Start: 2019-07-08 | End: 2020-01-09 | Stop reason: SDUPTHER

## 2019-07-23 ENCOUNTER — OFFICE VISIT (OUTPATIENT)
Dept: FAMILY MEDICINE CLINIC | Age: 74
End: 2019-07-23
Payer: MEDICARE

## 2019-07-23 VITALS
OXYGEN SATURATION: 97 % | RESPIRATION RATE: 16 BRPM | HEIGHT: 64 IN | SYSTOLIC BLOOD PRESSURE: 145 MMHG | WEIGHT: 189 LBS | BODY MASS INDEX: 32.27 KG/M2 | DIASTOLIC BLOOD PRESSURE: 80 MMHG | HEART RATE: 61 BPM

## 2019-07-23 DIAGNOSIS — Z85.42 HISTORY OF UTERINE CANCER: ICD-10-CM

## 2019-07-23 DIAGNOSIS — Z86.010 HISTORY OF COLONIC POLYPS: ICD-10-CM

## 2019-07-23 DIAGNOSIS — R93.89 ABNORMAL CHEST CT: ICD-10-CM

## 2019-07-23 DIAGNOSIS — E78.00 PURE HYPERCHOLESTEROLEMIA: ICD-10-CM

## 2019-07-23 DIAGNOSIS — E11.65 UNCONTROLLED TYPE 2 DIABETES MELLITUS WITH HYPERGLYCEMIA (HCC): Primary | ICD-10-CM

## 2019-07-23 DIAGNOSIS — R06.09 DOE (DYSPNEA ON EXERTION): ICD-10-CM

## 2019-07-23 DIAGNOSIS — I10 ESSENTIAL HYPERTENSION: ICD-10-CM

## 2019-07-23 DIAGNOSIS — E66.9 OBESITY, CLASS I, BMI 30-34.9: ICD-10-CM

## 2019-07-23 LAB — HBA1C MFR BLD: 9 %

## 2019-07-23 PROCEDURE — 1123F ACP DISCUSS/DSCN MKR DOCD: CPT | Performed by: INTERNAL MEDICINE

## 2019-07-23 PROCEDURE — 99214 OFFICE O/P EST MOD 30 MIN: CPT | Performed by: INTERNAL MEDICINE

## 2019-07-23 PROCEDURE — 2022F DILAT RTA XM EVC RTNOPTHY: CPT | Performed by: INTERNAL MEDICINE

## 2019-07-23 PROCEDURE — G8427 DOCREV CUR MEDS BY ELIG CLIN: HCPCS | Performed by: INTERNAL MEDICINE

## 2019-07-23 PROCEDURE — 3045F PR MOST RECENT HEMOGLOBIN A1C LEVEL 7.0-9.0%: CPT | Performed by: INTERNAL MEDICINE

## 2019-07-23 PROCEDURE — 1090F PRES/ABSN URINE INCON ASSESS: CPT | Performed by: INTERNAL MEDICINE

## 2019-07-23 PROCEDURE — 4040F PNEUMOC VAC/ADMIN/RCVD: CPT | Performed by: INTERNAL MEDICINE

## 2019-07-23 PROCEDURE — G8417 CALC BMI ABV UP PARAM F/U: HCPCS | Performed by: INTERNAL MEDICINE

## 2019-07-23 PROCEDURE — 1036F TOBACCO NON-USER: CPT | Performed by: INTERNAL MEDICINE

## 2019-07-23 PROCEDURE — 3017F COLORECTAL CA SCREEN DOC REV: CPT | Performed by: INTERNAL MEDICINE

## 2019-07-23 PROCEDURE — 83036 HEMOGLOBIN GLYCOSYLATED A1C: CPT | Performed by: INTERNAL MEDICINE

## 2019-07-23 PROCEDURE — G8399 PT W/DXA RESULTS DOCUMENT: HCPCS | Performed by: INTERNAL MEDICINE

## 2019-07-23 RX ORDER — GLIPIZIDE 5 MG/1
TABLET ORAL
Qty: 270 TABLET | Refills: 0 | Status: SHIPPED | OUTPATIENT
Start: 2019-07-23 | End: 2019-09-24 | Stop reason: SDUPTHER

## 2019-07-23 NOTE — PROGRESS NOTES
HPI: Ellard Sandifer presents for diabetes    Chronic health issues include obesity, hypertension, history uterine cancer, diabetes    M2. A1c of 8.5 May 2019. Weight is down 5 pounds. Sugars running between 80 and 200. Goes up with fruit. Exercising some but difficulty with weather. Insulin 42, metformin 850 twice daily. Januvia not beneficial.  Expensive. No foot concerns. Partial amputation. Negative retinopathy September 2018. Negative proteinuria May 2018. Positive pneumonia vaccine.       Hypertension stress echo negative in 2013. Ejection fraction 55% with left ventricular hypertrophy. Pulmonary function with restrictive disease and CT with interstitial lung disease in the dependent portions. Negative laboratories for connective tissue disease. Swallow without aspiration. Denies sleep apnea. No increased shortness of breath or cough. No inhalers. Breathing is stable. Transient lower extremity edema in [facility] now resolved. No shortness of breath with this.       uterine cancer. Total hysterectomy and radiation therapy follows up yearly with oncology. GYN exam performed July 2019.     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 Follows yearly. Positive callus     Colonic polyp. Recheck 2020. No rectal bleeding or change in stools. CT with interstitial changes lower lobes. Negative aspiration. Negative connective tissue disease. No increased shortness of breath. Has been recently with pneumonia and on oxygen.      Specialist  [facility]    Podiatrist    Oncologist.   Neurologist        PMH:    Total hyst for cancer     Partial amputation foot     SH: no tobacco. No alcohol.  33 years. Retired [employer]. Travels to [place] late October till April. [address], fishing, gardening.      FH: 7 brothers 2 sisters    + brother colon cancer 79,     - DM, CAD      review of systems: Up-to-date with eye exams. Denies any hearing loss or falls. No sleep apnea.  No right hand    HYSTERECTOMY      SKIN GRAFT               Family History   Problem Relation Age of Onset    Diabetes Mother     Heart Disease Mother     Heart Disease Father     Cancer Brother         prostate    Heart Disease Brother     Heart Disease Brother     Heart Disease Brother     Hypertension Other            Review of Systems        Objective     BP (!) 142/68   Pulse 61   Resp 16   Ht 5' 4\" (1.626 m)   Wt 189 lb (85.7 kg)   SpO2 97% Comment: RA  BMI 32.44 kg/m²     @LASTSAO2(3)@    Wt Readings from Last 3 Encounters:   05/23/19 194 lb (88 kg)   04/23/19 193 lb (87.5 kg)   10/23/18 194 lb (88 kg)       Physical Exam     NAD alert and cooperative  HEENT: TMs unremarkable. Throat is clear. No oral ulcers. Good upstroke of the carotids. No adenopathy or stridor. No hoarseness. No erythema posterior pharynx. Pink conjunctive a. Lungs dry rales base left greater than right. Good inspiration. No JVD. Cardiovascular exam regular rate and rhythm without any murmur click no gallop or S4. No dominant breast masses or discharge. Positive obesity. No hepatosplenomegaly noted. No rebound. Good range of motion the hips. Mild crepitus in knees. Diminished but present pulses lower extremities. Callus right great toe wound dorsum of the foot with amputation. Sensation is intact. No maceration.     Chemistry        Component Value Date/Time     04/23/2019 1119    K 5.0 04/23/2019 1119     04/23/2019 1119    CO2 21 04/23/2019 1119    BUN 19 04/23/2019 1119    CREATININE 0.9 04/23/2019 1119        Component Value Date/Time    CALCIUM 9.8 04/23/2019 1119    ALKPHOS 71 10/23/2018 1125    AST 45 (H) 10/23/2018 1125    ALT 35 10/23/2018 1125    BILITOT 0.6 10/23/2018 1125            Lab Results   Component Value Date    WBC 10.6 04/06/2013    HGB 12.1 04/06/2013    HCT 37.5 04/06/2013    MCV 87.4 04/06/2013     04/06/2013     Lab Results   Component Value Date    LABA1C 8.5

## 2019-07-23 NOTE — PATIENT INSTRUCTIONS
Eye exam in sept  Glipizide 5 mg before breakfast and 2 pills before biggest meal of day. Continue metformin  Eye exam September  If recurrent edema, check BP and I can adjust your blood pressure medication  Patient Education        Learning About Diabetes Food Guidelines  Your Care Instructions    Meal planning is important to manage diabetes. It helps keep your blood sugar at a target level (which you set with your doctor). You don't have to eat special foods. You can eat what your family eats, including sweets once in a while. But you do have to pay attention to how often you eat and how much you eat of certain foods. You may want to work with a dietitian or a certified diabetes educator (CDE) to help you plan meals and snacks. A dietitian or CDE can also help you lose weight if that is one of your goals. What should you know about eating carbs? Managing the amount of carbohydrate (carbs) you eat is an important part of healthy meals when you have diabetes. Carbohydrate is found in many foods. · Learn which foods have carbs. And learn the amounts of carbs in different foods. ? Bread, cereal, pasta, and rice have about 15 grams of carbs in a serving. A serving is 1 slice of bread (1 ounce), ½ cup of cooked cereal, or 1/3 cup of cooked pasta or rice. ? Fruits have 15 grams of carbs in a serving. A serving is 1 small fresh fruit, such as an apple or orange; ½ of a banana; ½ cup of cooked or canned fruit; ½ cup of fruit juice; 1 cup of melon or raspberries; or 2 tablespoons of dried fruit. ? Milk and no-sugar-added yogurt have 15 grams of carbs in a serving. A serving is 1 cup of milk or 2/3 cup of no-sugar-added yogurt. ? Starchy vegetables have 15 grams of carbs in a serving. A serving is ½ cup of mashed potatoes or sweet potato; 1 cup winter squash; ½ of a small baked potato; ½ cup of cooked beans; or ½ cup cooked corn or green peas.   · Learn how much carbs to eat each day and at each meal. A dietitian or healthy choice because people who have diabetes are at higher risk of heart disease. So choose lean cuts of meat and nonfat or low-fat dairy products. Use olive or canola oil instead of butter or shortening when cooking. · Don't skip meals. Your blood sugar may drop too low if you skip meals and take insulin or certain medicines for diabetes. · Check with your doctor before you drink alcohol. Alcohol can cause your blood sugar to drop too low. Alcohol can also cause a bad reaction if you take certain diabetes medicines. Follow-up care is a key part of your treatment and safety. Be sure to make and go to all appointments, and call your doctor if you are having problems. It's also a good idea to know your test results and keep a list of the medicines you take. Where can you learn more? Go to https://Radio One Llamamalikeb.Digit Wireless. org and sign in to your [employer] account. Enter P609 in the SpotOnWay box to learn more about \"Learning About Diabetes Food Guidelines. \"     If you do not have an account, please click on the \"Sign Up Now\" link. Current as of: July 25, 2018  Content Version: 12.0  © 2041-1033 Healthwise, Incorporated. Care instructions adapted under license by [facility] ([facility]). If you have questions about a medical condition or this instruction, always ask your healthcare professional. [address] any warranty or liability for your use of this information.

## 2019-07-25 ENCOUNTER — HOSPITAL ENCOUNTER (OUTPATIENT)
Dept: WOMENS IMAGING | Age: 74
Discharge: HOME | End: 2019-07-25
Payer: MEDICARE

## 2019-07-25 DIAGNOSIS — Z12.31 VISIT FOR SCREENING MAMMOGRAM: ICD-10-CM

## 2019-07-25 PROCEDURE — 77063 BREAST TOMOSYNTHESIS BI: CPT

## 2019-09-24 ENCOUNTER — OFFICE VISIT (OUTPATIENT)
Dept: FAMILY MEDICINE CLINIC | Age: 74
End: 2019-09-24
Payer: MEDICARE

## 2019-09-24 VITALS
WEIGHT: 189 LBS | BODY MASS INDEX: 32.27 KG/M2 | SYSTOLIC BLOOD PRESSURE: 132 MMHG | RESPIRATION RATE: 12 BRPM | DIASTOLIC BLOOD PRESSURE: 78 MMHG | HEIGHT: 64 IN | HEART RATE: 89 BPM | OXYGEN SATURATION: 95 %

## 2019-09-24 DIAGNOSIS — R42 VERTIGO: ICD-10-CM

## 2019-09-24 DIAGNOSIS — E78.00 PURE HYPERCHOLESTEROLEMIA: ICD-10-CM

## 2019-09-24 DIAGNOSIS — E66.9 OBESITY, CLASS I, BMI 30-34.9: ICD-10-CM

## 2019-09-24 DIAGNOSIS — E11.65 UNCONTROLLED TYPE 2 DIABETES MELLITUS WITH HYPERGLYCEMIA (HCC): Primary | ICD-10-CM

## 2019-09-24 DIAGNOSIS — Z23 NEED FOR INFLUENZA VACCINATION: ICD-10-CM

## 2019-09-24 DIAGNOSIS — Z85.42 HISTORY OF UTERINE CANCER: ICD-10-CM

## 2019-09-24 DIAGNOSIS — Z86.010 HISTORY OF COLONIC POLYPS: ICD-10-CM

## 2019-09-24 DIAGNOSIS — R06.09 DOE (DYSPNEA ON EXERTION): ICD-10-CM

## 2019-09-24 DIAGNOSIS — I10 ESSENTIAL HYPERTENSION: ICD-10-CM

## 2019-09-24 LAB
A/G RATIO: 1.4 (ref 1.1–2.2)
ALBUMIN SERPL-MCNC: 4.5 G/DL (ref 3.4–5)
ALP BLD-CCNC: 68 U/L (ref 40–129)
ALT SERPL-CCNC: 31 U/L (ref 10–40)
ANION GAP SERPL CALCULATED.3IONS-SCNC: 17 MMOL/L (ref 3–16)
AST SERPL-CCNC: 35 U/L (ref 15–37)
BILIRUB SERPL-MCNC: 0.4 MG/DL (ref 0–1)
BUN BLDV-MCNC: 20 MG/DL (ref 7–20)
CALCIUM SERPL-MCNC: 9.5 MG/DL (ref 8.3–10.6)
CHLORIDE BLD-SCNC: 101 MMOL/L (ref 99–110)
CHOLESTEROL, FASTING: 119 MG/DL (ref 0–199)
CO2: 24 MMOL/L (ref 21–32)
CREAT SERPL-MCNC: 0.8 MG/DL (ref 0.6–1.2)
GFR AFRICAN AMERICAN: >60
GFR NON-AFRICAN AMERICAN: >60
GLOBULIN: 3.3 G/DL
GLUCOSE BLD-MCNC: 108 MG/DL (ref 70–99)
HBA1C MFR BLD: 8.5 %
HDLC SERPL-MCNC: 38 MG/DL (ref 40–60)
LDL CHOLESTEROL CALCULATED: 48 MG/DL
POTASSIUM SERPL-SCNC: 4.8 MMOL/L (ref 3.5–5.1)
SODIUM BLD-SCNC: 142 MMOL/L (ref 136–145)
TOTAL PROTEIN: 7.8 G/DL (ref 6.4–8.2)
TRIGLYCERIDE, FASTING: 164 MG/DL (ref 0–150)
VLDLC SERPL CALC-MCNC: 33 MG/DL

## 2019-09-24 PROCEDURE — 36415 COLL VENOUS BLD VENIPUNCTURE: CPT | Performed by: INTERNAL MEDICINE

## 2019-09-24 PROCEDURE — 1090F PRES/ABSN URINE INCON ASSESS: CPT | Performed by: INTERNAL MEDICINE

## 2019-09-24 PROCEDURE — 1036F TOBACCO NON-USER: CPT | Performed by: INTERNAL MEDICINE

## 2019-09-24 PROCEDURE — G8427 DOCREV CUR MEDS BY ELIG CLIN: HCPCS | Performed by: INTERNAL MEDICINE

## 2019-09-24 PROCEDURE — G0008 ADMIN INFLUENZA VIRUS VAC: HCPCS | Performed by: INTERNAL MEDICINE

## 2019-09-24 PROCEDURE — G8417 CALC BMI ABV UP PARAM F/U: HCPCS | Performed by: INTERNAL MEDICINE

## 2019-09-24 PROCEDURE — 2022F DILAT RTA XM EVC RTNOPTHY: CPT | Performed by: INTERNAL MEDICINE

## 2019-09-24 PROCEDURE — 4040F PNEUMOC VAC/ADMIN/RCVD: CPT | Performed by: INTERNAL MEDICINE

## 2019-09-24 PROCEDURE — 1123F ACP DISCUSS/DSCN MKR DOCD: CPT | Performed by: INTERNAL MEDICINE

## 2019-09-24 PROCEDURE — 3017F COLORECTAL CA SCREEN DOC REV: CPT | Performed by: INTERNAL MEDICINE

## 2019-09-24 PROCEDURE — 99214 OFFICE O/P EST MOD 30 MIN: CPT | Performed by: INTERNAL MEDICINE

## 2019-09-24 PROCEDURE — G8399 PT W/DXA RESULTS DOCUMENT: HCPCS | Performed by: INTERNAL MEDICINE

## 2019-09-24 PROCEDURE — 90653 IIV ADJUVANT VACCINE IM: CPT | Performed by: INTERNAL MEDICINE

## 2019-09-24 PROCEDURE — 3045F PR MOST RECENT HEMOGLOBIN A1C LEVEL 7.0-9.0%: CPT | Performed by: INTERNAL MEDICINE

## 2019-09-24 PROCEDURE — 83036 HEMOGLOBIN GLYCOSYLATED A1C: CPT | Performed by: INTERNAL MEDICINE

## 2019-09-24 RX ORDER — LOSARTAN POTASSIUM 50 MG/1
TABLET ORAL
Qty: 90 TABLET | Refills: 1 | Status: SHIPPED | OUTPATIENT
Start: 2019-09-24 | End: 2020-07-01 | Stop reason: SDUPTHER

## 2019-09-24 RX ORDER — LOSARTAN POTASSIUM 25 MG/1
TABLET ORAL
Qty: 180 TABLET | Refills: 1 | Status: SHIPPED | OUTPATIENT
Start: 2019-09-24 | End: 2019-09-24

## 2019-09-24 NOTE — PATIENT INSTRUCTIONS
vertigo goes away. ? Sit up. If this causes vertigo, wait for it to stop. ? Repeat the procedure on the other side. ? Repeat this 10 times. Do these exercises 2 times a day until the vertigo is gone. When should you call for help? Call 911 anytime you think you may need emergency care. For example, call if:    · You have symptoms of a stroke. These may include:  ? Sudden numbness, tingling, weakness, or loss of movement in your face, arm, or leg, especially on only one side of your body. ? Sudden vision changes. ? Sudden trouble speaking. ? Sudden confusion or trouble understanding simple statements. ? Sudden problems with walking or balance. ? A sudden, severe headache that is different from past headaches.    Call your doctor now or seek immediate medical care if:    · You have new or worse nausea and vomiting.     · You have new symptoms such as hearing loss or roaring in your ears.    Watch closely for changes in your health, and be sure to contact your doctor if:    · You are not getting better as expected.     · Your vertigo gets worse. Where can you learn more? Go to https://King[employer] Network SciencepeSolicore.Tylr Mobile. org and sign in to your [employer] account. Enter  in the [employer] box to learn more about \"Benign Paroxysmal Positional Vertigo (BPPV): Care Instructions. \"     If you do not have an account, please click on the \"Sign Up Now\" link. Current as of: October 21, 2018  Content Version: 12.1  © 2817-8581 Healthwise, Incorporated. Care instructions adapted under license by [facility] ([facility]). If you have questions about a medical condition or this instruction, always ask your healthcare professional. [place] [address] any warranty or liability for your use of this information. Patient Education        Learning About Diabetes Food Guidelines  Your Care Instructions    Meal planning is important to manage diabetes.  It helps keep your blood sugar at a target level (which you set with your doctor). You don't have to eat special foods. You can eat what your family eats, including sweets once in a while. But you do have to pay attention to how often you eat and how much you eat of certain foods. You may want to work with a dietitian or a certified diabetes educator (CDE) to help you plan meals and snacks. A dietitian or CDE can also help you lose weight if that is one of your goals. What should you know about eating carbs? Managing the amount of carbohydrate (carbs) you eat is an important part of healthy meals when you have diabetes. Carbohydrate is found in many foods. · Learn which foods have carbs. And learn the amounts of carbs in different foods. ? Bread, cereal, pasta, and rice have about 15 grams of carbs in a serving. A serving is 1 slice of bread (1 ounce), ½ cup of cooked cereal, or 1/3 cup of cooked pasta or rice. ? Fruits have 15 grams of carbs in a serving. A serving is 1 small fresh fruit, such as an apple or orange; ½ of a banana; ½ cup of cooked or canned fruit; ½ cup of fruit juice; 1 cup of melon or raspberries; or 2 tablespoons of dried fruit. ? Milk and no-sugar-added yogurt have 15 grams of carbs in a serving. A serving is 1 cup of milk or 2/3 cup of no-sugar-added yogurt. ? Starchy vegetables have 15 grams of carbs in a serving. A serving is ½ cup of mashed potatoes or sweet potato; 1 cup winter squash; ½ of a small baked potato; ½ cup of cooked beans; or ½ cup cooked corn or green peas. · Learn how much carbs to eat each day and at each meal. A dietitian or CDE can teach you how to keep track of the amount of carbs you eat. This is called carbohydrate counting. · If you are not sure how to count carbohydrate grams, use the Plate Method to plan meals. It is a good, quick way to make sure that you have a balanced meal. It also helps you spread carbs throughout the day. ? Divide your plate by types of foods.  Put non-starchy vegetables on half the plate, meat or other protein food on one-quarter of the plate, and a grain or starchy vegetable in the final quarter of the plate. To this you can add a small piece of fruit and 1 cup of milk or yogurt, depending on how many carbs you are supposed to eat at a meal.  · Try to eat about the same amount of carbs at each meal. Do not \"save up\" your daily allowance of carbs to eat at one meal.  · Proteins have very little or no carbs per serving. Examples of proteins are beef, chicken, turkey, fish, eggs, tofu, cheese, cottage cheese, and peanut butter. A serving size of meat is 3 ounces, which is about the size of a deck of cards. Examples of meat substitute serving sizes (equal to 1 ounce of meat) are 1/4 cup of cottage cheese, 1 egg, 1 tablespoon of peanut butter, and ½ cup of tofu. How can you eat out and still eat healthy? · Learn to estimate the serving sizes of foods that have carbohydrate. If you measure food at home, it will be easier to estimate the amount in a serving of restaurant food. · If the meal you order has too much carbohydrate (such as potatoes, corn, or baked beans), ask to have a low-carbohydrate food instead. Ask for a salad or green vegetables. · If you use insulin, check your blood sugar before and after eating out to help you plan how much to eat in the future. · If you eat more carbohydrate at a meal than you had planned, take a walk or do other exercise. This will help lower your blood sugar. What else should you know? · Limit saturated fat, such as the fat from meat and dairy products. This is a healthy choice because people who have diabetes are at higher risk of heart disease. So choose lean cuts of meat and nonfat or low-fat dairy products. Use olive or canola oil instead of butter or shortening when cooking. · Don't skip meals. Your blood sugar may drop too low if you skip meals and take insulin or certain medicines for diabetes. · Check with your doctor before you drink alcohol.
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
## 2019-10-17 ENCOUNTER — HOSPITAL ENCOUNTER (OUTPATIENT)
Dept: ULTRASOUND IMAGING | Age: 74
Discharge: HOME | End: 2019-10-17
Payer: MEDICARE

## 2019-10-17 DIAGNOSIS — E04.2 GOITER, NONTOXIC, MULTINODULAR: ICD-10-CM

## 2019-10-17 PROCEDURE — 76536 US EXAM OF HEAD AND NECK: CPT

## 2019-10-29 ENCOUNTER — TELEPHONE (OUTPATIENT)
Dept: FAMILY MEDICINE CLINIC | Age: 74
End: 2019-10-29

## 2019-11-20 ENCOUNTER — TELEPHONE (OUTPATIENT)
Dept: FAMILY MEDICINE CLINIC | Age: 74
End: 2019-11-20

## 2020-01-09 ENCOUNTER — TELEPHONE (OUTPATIENT)
Dept: FAMILY MEDICINE CLINIC | Age: 75
End: 2020-01-09

## 2020-01-09 NOTE — TELEPHONE ENCOUNTER
PT requesting refills.   Use [facility] pharmacy       ---metFORMIN (GLUCOPHAGE) 850 MG tablet       ---glipiZIDE (GLUCOTROL) 5 MG tablet       ---atorvastatin (LIPITOR) 10 MG tablet       ---metoprolol tartrate (LOPRESSOR) 50 MG tablet

## 2020-01-10 RX ORDER — ATORVASTATIN CALCIUM 10 MG/1
TABLET, FILM COATED ORAL
Qty: 90 TABLET | Refills: 1 | Status: SHIPPED | OUTPATIENT
Start: 2020-01-10 | End: 2020-07-01 | Stop reason: SDUPTHER

## 2020-01-10 RX ORDER — GLIPIZIDE 5 MG/1
TABLET ORAL
Qty: 450 TABLET | Refills: 0 | Status: SHIPPED | OUTPATIENT
Start: 2020-01-10 | End: 2020-04-01

## 2020-01-10 RX ORDER — METOPROLOL TARTRATE 50 MG/1
TABLET, FILM COATED ORAL
Qty: 180 TABLET | Refills: 1 | Status: SHIPPED | OUTPATIENT
Start: 2020-01-10 | End: 2020-07-01 | Stop reason: SDUPTHER

## 2020-01-10 NOTE — TELEPHONE ENCOUNTER
Pt returning call. States she is in [facility] until April. Will schedule when she gets back in town.

## 2020-04-01 RX ORDER — GLIPIZIDE 5 MG/1
TABLET ORAL
Qty: 450 TABLET | Refills: 0 | Status: SHIPPED | OUTPATIENT
Start: 2020-04-01 | End: 2020-06-22

## 2020-04-01 NOTE — PROGRESS NOTES
NOTE - this visit conducted via audiovisual means : MyChart, Doxy, etc, in accordance with CMS guidelines. Visit initiated at patient or caregiver request with permission to bill to insurer granted.     PATIENT DOESN'T HAVE SMART DEVICE--[phone]
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
## 2020-04-14 ENCOUNTER — VIRTUAL VISIT (OUTPATIENT)
Dept: FAMILY MEDICINE CLINIC | Age: 75
End: 2020-04-14
Payer: MEDICARE

## 2020-04-14 PROCEDURE — 99442 PR PHYS/QHP TELEPHONE EVALUATION 11-20 MIN: CPT | Performed by: INTERNAL MEDICINE

## 2020-05-06 RX ORDER — PEN NEEDLE, DIABETIC 32GX 5/32"
NEEDLE, DISPOSABLE MISCELLANEOUS
Qty: 100 EACH | Refills: 3 | Status: SHIPPED | OUTPATIENT
Start: 2020-05-06 | End: 2021-04-19

## 2020-06-08 RX ORDER — CALCIUM CITRATE/VITAMIN D3 200MG-6.25
TABLET ORAL
Qty: 100 STRIP | Refills: 0 | Status: SHIPPED | OUTPATIENT
Start: 2020-06-08 | End: 2020-08-21

## 2020-06-22 RX ORDER — GLIPIZIDE 5 MG/1
TABLET ORAL
Qty: 450 TABLET | Refills: 0 | Status: SHIPPED | OUTPATIENT
Start: 2020-06-22 | End: 2020-07-02

## 2020-07-01 ENCOUNTER — OFFICE VISIT (OUTPATIENT)
Dept: FAMILY MEDICINE CLINIC | Age: 75
End: 2020-07-01
Payer: MEDICARE

## 2020-07-01 VITALS
TEMPERATURE: 97.4 F | SYSTOLIC BLOOD PRESSURE: 138 MMHG | OXYGEN SATURATION: 92 % | WEIGHT: 188 LBS | HEIGHT: 64 IN | BODY MASS INDEX: 32.1 KG/M2 | DIASTOLIC BLOOD PRESSURE: 80 MMHG | HEART RATE: 68 BPM

## 2020-07-01 LAB
A/G RATIO: 1.4 (ref 1.1–2.2)
ALBUMIN SERPL-MCNC: 4.3 G/DL (ref 3.4–5)
ALP BLD-CCNC: 66 U/L (ref 40–129)
ALT SERPL-CCNC: 29 U/L (ref 10–40)
ANION GAP SERPL CALCULATED.3IONS-SCNC: 16 MMOL/L (ref 3–16)
AST SERPL-CCNC: 32 U/L (ref 15–37)
BILIRUB SERPL-MCNC: 0.4 MG/DL (ref 0–1)
BUN BLDV-MCNC: 21 MG/DL (ref 7–20)
CALCIUM SERPL-MCNC: 9.5 MG/DL (ref 8.3–10.6)
CHLORIDE BLD-SCNC: 98 MMOL/L (ref 99–110)
CO2: 23 MMOL/L (ref 21–32)
CREAT SERPL-MCNC: 0.9 MG/DL (ref 0.6–1.2)
CREATININE URINE: 152 MG/DL (ref 28–259)
GFR AFRICAN AMERICAN: >60
GFR NON-AFRICAN AMERICAN: >60
GLOBULIN: 3.1 G/DL
GLUCOSE BLD-MCNC: 160 MG/DL (ref 70–99)
HBA1C MFR BLD: 9.2 %
MICROALBUMIN UR-MCNC: 2.4 MG/DL
MICROALBUMIN/CREAT UR-RTO: 15.8 MG/G (ref 0–30)
POTASSIUM SERPL-SCNC: 4.6 MMOL/L (ref 3.5–5.1)
SODIUM BLD-SCNC: 137 MMOL/L (ref 136–145)
TOTAL PROTEIN: 7.4 G/DL (ref 6.4–8.2)

## 2020-07-01 PROCEDURE — G8399 PT W/DXA RESULTS DOCUMENT: HCPCS | Performed by: INTERNAL MEDICINE

## 2020-07-01 PROCEDURE — 3017F COLORECTAL CA SCREEN DOC REV: CPT | Performed by: INTERNAL MEDICINE

## 2020-07-01 PROCEDURE — G8417 CALC BMI ABV UP PARAM F/U: HCPCS | Performed by: INTERNAL MEDICINE

## 2020-07-01 PROCEDURE — 1036F TOBACCO NON-USER: CPT | Performed by: INTERNAL MEDICINE

## 2020-07-01 PROCEDURE — 3046F HEMOGLOBIN A1C LEVEL >9.0%: CPT | Performed by: INTERNAL MEDICINE

## 2020-07-01 PROCEDURE — 99214 OFFICE O/P EST MOD 30 MIN: CPT | Performed by: INTERNAL MEDICINE

## 2020-07-01 PROCEDURE — 1090F PRES/ABSN URINE INCON ASSESS: CPT | Performed by: INTERNAL MEDICINE

## 2020-07-01 PROCEDURE — 36415 COLL VENOUS BLD VENIPUNCTURE: CPT | Performed by: INTERNAL MEDICINE

## 2020-07-01 PROCEDURE — 2022F DILAT RTA XM EVC RTNOPTHY: CPT | Performed by: INTERNAL MEDICINE

## 2020-07-01 PROCEDURE — 83036 HEMOGLOBIN GLYCOSYLATED A1C: CPT | Performed by: INTERNAL MEDICINE

## 2020-07-01 PROCEDURE — 1123F ACP DISCUSS/DSCN MKR DOCD: CPT | Performed by: INTERNAL MEDICINE

## 2020-07-01 PROCEDURE — G8427 DOCREV CUR MEDS BY ELIG CLIN: HCPCS | Performed by: INTERNAL MEDICINE

## 2020-07-01 PROCEDURE — 4040F PNEUMOC VAC/ADMIN/RCVD: CPT | Performed by: INTERNAL MEDICINE

## 2020-07-01 RX ORDER — INSULIN DETEMIR 100 [IU]/ML
INJECTION, SOLUTION SUBCUTANEOUS
Qty: 45 ML | Refills: 3 | Status: SHIPPED | OUTPATIENT
Start: 2020-07-01 | End: 2020-10-21 | Stop reason: SDUPTHER

## 2020-07-01 RX ORDER — GLIPIZIDE 5 MG/1
TABLET ORAL
Qty: 6 TABLET | Refills: 0 | Status: CANCELLED | OUTPATIENT
Start: 2020-07-01

## 2020-07-01 RX ORDER — ASPIRIN 81 MG/1
81 TABLET ORAL DAILY
Qty: 90 TABLET | Refills: 3 | Status: SHIPPED | OUTPATIENT
Start: 2020-07-01

## 2020-07-01 RX ORDER — LORATADINE 10 MG/1
CAPSULE, LIQUID FILLED ORAL
Qty: 90 CAPSULE | Refills: 3 | Status: SHIPPED | OUTPATIENT
Start: 2020-07-01

## 2020-07-01 RX ORDER — ATORVASTATIN CALCIUM 10 MG/1
TABLET, FILM COATED ORAL
Qty: 90 TABLET | Refills: 1 | Status: SHIPPED | OUTPATIENT
Start: 2020-07-01 | End: 2020-12-14 | Stop reason: SDUPTHER

## 2020-07-01 RX ORDER — METOPROLOL TARTRATE 50 MG/1
TABLET, FILM COATED ORAL
Qty: 180 TABLET | Refills: 1 | Status: SHIPPED | OUTPATIENT
Start: 2020-07-01 | End: 2020-12-14 | Stop reason: SDUPTHER

## 2020-07-01 RX ORDER — LOSARTAN POTASSIUM 50 MG/1
TABLET ORAL
Qty: 90 TABLET | Refills: 1 | Status: SHIPPED | OUTPATIENT
Start: 2020-07-01 | End: 2020-12-14 | Stop reason: SDUPTHER

## 2020-07-01 ASSESSMENT — PATIENT HEALTH QUESTIONNAIRE - PHQ9
SUM OF ALL RESPONSES TO PHQ QUESTIONS 1-9: 0
1. LITTLE INTEREST OR PLEASURE IN DOING THINGS: 0
SUM OF ALL RESPONSES TO PHQ9 QUESTIONS 1 & 2: 0
2. FEELING DOWN, DEPRESSED OR HOPELESS: 0
SUM OF ALL RESPONSES TO PHQ QUESTIONS 1-9: 0

## 2020-07-01 NOTE — PROGRESS NOTES
HPI: Andrea Arredondo presents for hepatic follow-up. Chronic health issues include obesity, hypertension, history uterine cancer, diabetes abnormal CT chest,    DM2 uncontrolled. A1c 8.3 September 2019. Currently taking metformin 850 twice daily, insulin 42 units nightly, glipizide 35 mg a day. States sugars have been running between 123 and 200. She is not exercising. Negative proteinuria April 2019. No foot concerns. Positive partial amputation and callus from injury, no visual complaints. Weight is excessive and down 1 pound. Considerable financial constraints. His insulin is $2000 for 3 months. Hypertension. Follows with cardiology. Stress echo 2013 with ejection fraction 55% and left ventricular hypertrophy. Pulmonary function with restrictive disease and CT consistent with interstitial lung disease with in the dependent portions. States that her shortness of breath is worsened. Positive snoring no known apnea. Weight is unchanged. Negative screen for connective tissue. No aspiration seen on swallow study remotely. No inhalers. No current edema. No chest pain or palpitations.         uterine cancer. Total hysterectomy and radiation therapy follows up yearly with oncology.  GYN exam mammogram July 2019.     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 Follows yearly. Positive callus     Colonic polyp. Recheck 2020. No rectal bleeding or change in stools.         Specialist  [facility]    Podiatrist    Oncologist.   Neurologist        PMH:    Total hyst for cancer     Partial amputation foot     SH: no tobacco. No alcohol.  33 years. Retired [employer] ran [email or web address]. Travels to [place] late October till April. [address] [employer], fishing, gardening.      FH: 7 brothers 2 sisters    + brother colon cancer 79,     - DM, CAD      review of systems: . Denies any hearing loss or falls. No sleep apnea. No current sinus symptoms. Denies any wheezing pneumonias.  Does have shortness of breath with exertion abnormal CT chest and restrictive lung disease on pulmonary function. Elissa Papa chest pain palpitations.  Transient lower extremity edema.  Blood pressure been good. Rare GE reflux. Occasional constipation. History of proteinuria now resolved. . Occasionally urine fields and smells strong. No vaginal bleeding. Denies any joint issues other than she will amputation left foot. No falls.  Has some difficulty walking secondary to discomfort.       Constitutional, ent, CV, respiratory, GI, , joint, skin, allergic and psychiatric ROS reviewed and negative except for above    No Known Allergies    Outpatient Medications Marked as Taking for the 7/1/20 encounter (Office Visit) with Elroy Sterling MD   Medication Sig Dispense Refill    insulin detemir (LEVEMIR FLEXTOUCH) 100 UNIT/ML injection pen 42 units sc q 24 hours 45 mL 3    metFORMIN (GLUCOPHAGE) 850 MG tablet TAKE 1 TABLET TWICE DAILY  WITH MEALS 180 tablet 1    atorvastatin (LIPITOR) 10 MG tablet TAKE 1 TABLET DAILY 90 tablet 1    metoprolol tartrate (LOPRESSOR) 50 MG tablet TAKE 1 TABLET TWICE A  tablet 1    losartan (COZAAR) 50 MG tablet 1 po q day to replace 2 25 mg tablets 90 tablet 1    aspirin 81 MG EC tablet Take 1 tablet by mouth daily 90 tablet 3    loratadine (CLARITIN) 10 MG capsule 1 po q day 90 capsule 3    glipiZIDE (GLUCOTROL) 5 MG tablet TAKE 2 TABLETS BEFORE      BREAKFAST AND 3 TABLETS    BEFORE SUPPER (Patient taking differently: Indications: pt is taking 3 in am 4 at night TAKE 2 TABLETS BEFORE      BREAKFAST AND 3 TABLETS    BEFORE SUPPER) 450 tablet 0    blood glucose test strips (TRUE METRIX BLOOD GLUCOSE TEST) strip TEST TWICE DAILY 100 strip 0    BD PEN NEEDLE CIELO U/F 32G X 4 MM MISC USE AND DISCARD 1 PEN      NEEDLE DAILY 100 each 3    zoster recombinant adjuvanted vaccine (SHINGRIX) 50 MCG/0.5ML SUSR injection Inject 0.5 mLs into the muscle See Admin Instructions 1 dose now and repeat in 2-6 months 0.5 mL 0    Blood Glucose Monitoring Suppl BHANU Disp 1  Also lancets and strips for bid testing 100 RF x3 1 Device 0    Lancets MISC 1 each by Does not apply route daily 100 each 3    Blood Glucose Monitoring Suppl BHANU Disp 1 for diabetes 1 Device 0    ACCU-CHEK SOFTCLIX LANCETS MISC Twice daily sugars check 100 each 3             Past Medical History:   Diagnosis Date    Abnormal chest CT 7/23/2019    ILd ?  Accident caused by powered      left foot. injury     Hyperlipidemia     Hypertension     Obesity, Class I, BMI 30-34.9 10/23/2018    Type II or unspecified type diabetes mellitus without mention of complication, not stated as uncontrolled     Vertigo 9/24/2019       Past Surgical History:   Procedure Laterality Date    COLONOSCOPY      FINGER TRIGGER RELEASE      thumb right hand    HYSTERECTOMY      SKIN GRAFT               Family History   Problem Relation Age of Onset    Diabetes Mother     Heart Disease Mother     Heart Disease Father     Cancer Brother         prostate    Heart Disease Brother     Heart Disease Brother     Heart Disease Brother     Hypertension Other            Review of Systems        Objective     /80   Pulse 68   Temp 97.4 °F (36.3 °C)   Ht 5' 4\" (1.626 m)   Wt 188 lb (85.3 kg)   SpO2 92%   BMI 32.27 kg/m²     @LASTSAO2(3)@    Wt Readings from Last 3 Encounters:   09/24/19 189 lb (85.7 kg)   07/23/19 189 lb (85.7 kg)   05/23/19 194 lb (88 kg)       Physical Exam     NAD alert and cooperative  HEENT: Small hypopharynx. None crowded. Good dentition. Full neck. Good upstroke of the carotids. Lungs are clear. Good OSIEL ratio without any wheezes rales or rhonchi. Breast without any dominant masses or discharge. Pendulous. No intertriginous rash. Cardiovascular exam regular rate and rhythm without any murmur click. Abdomen is benign no hepatosplenomegaly. Positive obesity. Excellent pulses feet. Sensation intact. Partial amputation left foot. Large callus on her heel. No maceration. No suspicious skin lesions or nodules. Ambulatory oximetry 92 going down to 85 with a brisk walk. Quick recovery. Chemistry        Component Value Date/Time     09/24/2019 1017    K 4.8 09/24/2019 1017     09/24/2019 1017    CO2 24 09/24/2019 1017    BUN 20 09/24/2019 1017    CREATININE 0.8 09/24/2019 1017        Component Value Date/Time    CALCIUM 9.5 09/24/2019 1017    ALKPHOS 68 09/24/2019 1017    AST 35 09/24/2019 1017    ALT 31 09/24/2019 1017    BILITOT 0.4 09/24/2019 1017            Lab Results   Component Value Date    WBC 10.6 04/06/2013    HGB 12.1 04/06/2013    HCT 37.5 04/06/2013    MCV 87.4 04/06/2013     04/06/2013     Lab Results   Component Value Date    LABA1C 8.5 09/24/2019     Lab Results   Component Value Date    .7 10/07/2017     Lab Results   Component Value Date    LABA1C 8.5 09/24/2019     No components found for: CHLPL  Lab Results   Component Value Date    TRIG 196 (H) 10/23/2018    TRIG 139 10/07/2017    TRIG 127 04/05/2017     Lab Results   Component Value Date    HDL 38 (L) 09/24/2019    HDL 35 (L) 10/23/2018    HDL 41 10/07/2017     Lab Results   Component Value Date    LDLCALC 48 09/24/2019    LDLCALC 52 10/23/2018    LDLCALC 60 10/07/2017     Lab Results   Component Value Date    LABVLDL 33 09/24/2019    LABVLDL 39 10/23/2018    LABVLDL 28 10/07/2017       Old labs and records reviewed or requested  Discussed past lab and studies with patient      Diagnosis Orders   1. Uncontrolled type 2 diabetes mellitus with hyperglycemia (HCC)  POCT glycosylated hemoglobin (Hb A1C)    Comprehensive Metabolic Panel    MICROALBUMIN / CREATININE URINE RATIO    metFORMIN (GLUCOPHAGE) 850 MG tablet   2. History of uterine cancer     3. Pure hypercholesterolemia  atorvastatin (LIPITOR) 10 MG tablet   4. Vertigo     5. Obesity, Class I, BMI 30-34.9     6.  Essential hypertension  Comprehensive Metabolic Panel    metoprolol tartrate (LOPRESSOR) 50 MG tablet   7. History of radiation therapy     8. History of colonic polyps     9. WINSTON (dyspnea on exertion)     10. Abnormal chest CT     11. Abnormal PFT         A1c 9.2. Will increase glipizide. Check on less expensive insulin. Consider Actos as well. Victoza most likely cost prohibitive. History of uterine cancer following up with specialist.    Hyperlipidemia atorvastatin lipid profile next visit. Obesity. 10 pound weight loss prior to next visit. History colonic polyps. Due colonoscopy 2020. Dyspnea on exertion, restrictive lung disease, discussed pulmonary and cardiac follow-up. She declines at this time wishes to slowly increase her exercise and recheck in the future      Return in about 3 months (around 10/1/2020). Diagnosis and treatment discussed.   Possible side effects of medication reviewed  Patients questions answered  Follow up understood  Pt aware if they are not contacted about any test results , this does not mean they are normal.  They should call

## 2020-07-01 NOTE — PATIENT INSTRUCTIONS
Glipizide 10 before breakfast and 30 mg prior to supper  Start actos 30 mg 1/2 daily x 2 weeks then 1 po q day   exrcise 30 min daily  10 pound weight loss before next visit.   Check with pharmacy for cost of similar insulin,   Mammogram and colonscopy when ready

## 2020-07-02 ENCOUNTER — TELEPHONE (OUTPATIENT)
Dept: FAMILY MEDICINE CLINIC | Age: 75
End: 2020-07-02

## 2020-07-02 RX ORDER — GLIPIZIDE 10 MG/1
TABLET ORAL
Qty: 360 TABLET | Refills: 0 | Status: SHIPPED | OUTPATIENT
Start: 2020-07-02 | End: 2020-10-05 | Stop reason: SDUPTHER

## 2020-07-02 NOTE — TELEPHONE ENCOUNTER
Returning call. Notified pt of results on lab \"Notes recorded by Lauren Avalos MD on 7/2/2020 at 7:46 AM EDT  Small amount of protein in urine again from diabetes  Sugar 160  Kidney blood test good. Let me know about your insulin. There are some other options, one caused victoza which is injected once weekly plus actos which is a pill\"    Pt is wanting to know what she needs to do about the protein in her urine? With the dm med, states she still needs to reach out to [address] regarding price.   Pt is reachable at [phone]

## 2020-07-08 RX ORDER — ATORVASTATIN CALCIUM 10 MG/1
TABLET, FILM COATED ORAL
Qty: 90 TABLET | Refills: 1 | OUTPATIENT
Start: 2020-07-08

## 2020-07-08 RX ORDER — METOPROLOL TARTRATE 50 MG/1
TABLET, FILM COATED ORAL
Qty: 180 TABLET | Refills: 1 | OUTPATIENT
Start: 2020-07-08

## 2020-08-21 RX ORDER — CALCIUM CITRATE/VITAMIN D3 200MG-6.25
TABLET ORAL
Qty: 100 STRIP | Refills: 0 | Status: SHIPPED | OUTPATIENT
Start: 2020-08-21 | End: 2020-12-03 | Stop reason: SDUPTHER

## 2020-09-15 ENCOUNTER — HOSPITAL ENCOUNTER (OUTPATIENT)
Dept: WOMENS IMAGING | Age: 75
Discharge: HOME | End: 2020-09-15
Payer: MEDICARE

## 2020-09-15 PROCEDURE — 77063 BREAST TOMOSYNTHESIS BI: CPT

## 2020-10-05 ENCOUNTER — OFFICE VISIT (OUTPATIENT)
Dept: FAMILY MEDICINE CLINIC | Age: 75
End: 2020-10-05
Payer: MEDICARE

## 2020-10-05 VITALS
RESPIRATION RATE: 12 BRPM | OXYGEN SATURATION: 93 % | HEIGHT: 64 IN | HEART RATE: 65 BPM | DIASTOLIC BLOOD PRESSURE: 75 MMHG | SYSTOLIC BLOOD PRESSURE: 142 MMHG | WEIGHT: 188 LBS | BODY MASS INDEX: 32.1 KG/M2

## 2020-10-05 LAB
CHOLESTEROL, TOTAL: 123 MG/DL (ref 0–199)
HBA1C MFR BLD: 8 %
HDLC SERPL-MCNC: 34 MG/DL (ref 40–60)
LDL CHOLESTEROL CALCULATED: 57 MG/DL
TRIGL SERPL-MCNC: 162 MG/DL (ref 0–150)
VLDLC SERPL CALC-MCNC: 32 MG/DL

## 2020-10-05 PROCEDURE — G8399 PT W/DXA RESULTS DOCUMENT: HCPCS | Performed by: INTERNAL MEDICINE

## 2020-10-05 PROCEDURE — 99214 OFFICE O/P EST MOD 30 MIN: CPT | Performed by: INTERNAL MEDICINE

## 2020-10-05 PROCEDURE — G0008 ADMIN INFLUENZA VIRUS VAC: HCPCS | Performed by: INTERNAL MEDICINE

## 2020-10-05 PROCEDURE — 3017F COLORECTAL CA SCREEN DOC REV: CPT | Performed by: INTERNAL MEDICINE

## 2020-10-05 PROCEDURE — G8484 FLU IMMUNIZE NO ADMIN: HCPCS | Performed by: INTERNAL MEDICINE

## 2020-10-05 PROCEDURE — G8417 CALC BMI ABV UP PARAM F/U: HCPCS | Performed by: INTERNAL MEDICINE

## 2020-10-05 PROCEDURE — 1090F PRES/ABSN URINE INCON ASSESS: CPT | Performed by: INTERNAL MEDICINE

## 2020-10-05 PROCEDURE — 1036F TOBACCO NON-USER: CPT | Performed by: INTERNAL MEDICINE

## 2020-10-05 PROCEDURE — 36415 COLL VENOUS BLD VENIPUNCTURE: CPT | Performed by: INTERNAL MEDICINE

## 2020-10-05 PROCEDURE — 4040F PNEUMOC VAC/ADMIN/RCVD: CPT | Performed by: INTERNAL MEDICINE

## 2020-10-05 PROCEDURE — 1123F ACP DISCUSS/DSCN MKR DOCD: CPT | Performed by: INTERNAL MEDICINE

## 2020-10-05 PROCEDURE — 2022F DILAT RTA XM EVC RTNOPTHY: CPT | Performed by: INTERNAL MEDICINE

## 2020-10-05 PROCEDURE — 90694 VACC AIIV4 NO PRSRV 0.5ML IM: CPT | Performed by: INTERNAL MEDICINE

## 2020-10-05 PROCEDURE — 83036 HEMOGLOBIN GLYCOSYLATED A1C: CPT | Performed by: INTERNAL MEDICINE

## 2020-10-05 PROCEDURE — 3052F HG A1C>EQUAL 8.0%<EQUAL 9.0%: CPT | Performed by: INTERNAL MEDICINE

## 2020-10-05 PROCEDURE — G8428 CUR MEDS NOT DOCUMENT: HCPCS | Performed by: INTERNAL MEDICINE

## 2020-10-05 RX ORDER — GLIPIZIDE 10 MG/1
TABLET ORAL
Qty: 360 TABLET | Refills: 0 | Status: SHIPPED | OUTPATIENT
Start: 2020-10-05 | End: 2021-03-08

## 2020-10-05 NOTE — PROGRESS NOTES
d    HPI: Nura Avilez presents for diabetes    Chronic health issues include obesity, hypertension, history uterine cancer, diabetes abnormal CT chest,    DM2 with A1c of 9.2 in July 2020. Lipid Zide was increased. She is currently taking metformin 850 twice a day insulin 42 units nightly and glipizide 10 in the morning and 30 at nighttime. No low sugars. Checking sugars which have run between 84 and 213. She is active but no formal exercise. Positive proteinuria last visit. No foot concerns. Partial amputation and callus from old injury. No visual complaints. States normal eye exam this year. Positive financial constraints. Insulin cost $2000 for 3 months. Victoza and Jardiance cost prohibited      Hypertension. Follows with cardiology. Stress echo 2013 with ejection fraction 55% and left ventricular hypertrophy. Pulmonary function with restrictive disease and CT consistent with interstitial lung disease within the dependent portions. States that her shortness of breath is stable. Positive snoring no known apnea. Negative screen for connective tissue. No aspiration seen on swallow study remotely. No inhalers. No current edema. No chest pain or palpitations.       Hx  uterine cancer. Total hysterectomy and radiation therapy follows up yearly with oncology.  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 Follows yearly. Positive callus     Colonic polyp. Recheck 2020. No rectal bleeding or change in stools. Owing to hold off until the pandemic is over prior to having the colonoscopy.         Specialist  [facility]    Podiatrist    Oncologist.   Neurologist   GI         PMH:    Total hyst for cancer     Partial amputation foot     SH: no tobacco. No alcohol.  33 years. Retired [employer] ran [employer]. Travels to [place] late October till April.  [address] [employer], fishing, gardening.      FH: 7 brothers 2 sisters    + brother colon cancer 79,     - DM, CAD      review of systems: . Denies any hearing loss or falls. No sleep apnea. Casual vertigo when she rolls over in bed. No current sinus symptoms. Denies any wheezing pneumonias. Does have shortness of breath with exertion abnormal CT chest and restrictive lung disease on pulmonary function. . No progression in symptoms.  No chest pain palpitations.  Transient lower extremity edema.  Blood pressure been good. Rare GE reflux. Occasional constipation. History of proteinuria now resolved. . Current urine complaints. No vaginal bleeding. Denies any joint issues other than she will amputation left foot. No falls.  Has some difficulty walking secondary to discomfort.      Constitutional, ent, CV, respiratory, GI, , joint, skin, allergic and psychiatric ROS reviewed and negative except for above    No Known Allergies    Outpatient Medications Marked as Taking for the 10/5/20 encounter (Office Visit) with Farheen Fisher MD   Medication Sig Dispense Refill    glipiZIDE (GLUCOTROL) 10 MG tablet 1 po before breakfast and 3 before supper 360 tablet 0    TRUE METRIX BLOOD GLUCOSE TEST strip TEST TWICE DAILY 100 strip 0    insulin detemir (LEVEMIR FLEXTOUCH) 100 UNIT/ML injection pen 42 units sc q 24 hours 45 mL 3    metFORMIN (GLUCOPHAGE) 850 MG tablet TAKE 1 TABLET TWICE DAILY  WITH MEALS 180 tablet 1    atorvastatin (LIPITOR) 10 MG tablet TAKE 1 TABLET DAILY 90 tablet 1    metoprolol tartrate (LOPRESSOR) 50 MG tablet TAKE 1 TABLET TWICE A  tablet 1    losartan (COZAAR) 50 MG tablet 1 po q day to replace 2 25 mg tablets 90 tablet 1    aspirin 81 MG EC tablet Take 1 tablet by mouth daily 90 tablet 3    loratadine (CLARITIN) 10 MG capsule 1 po q day 90 capsule 3    BD PEN NEEDLE CIELO U/F 32G X 4 MM MISC USE AND DISCARD 1 PEN      NEEDLE DAILY 100 each 3    zoster recombinant adjuvanted vaccine (SHINGRIX) 50 MCG/0.5ML SUSR injection Inject 0.5 mLs into the muscle See Admin Instructions 1 dose now and repeat in 2-6 months 0.5 mL 0    Blood Glucose Monitoring Suppl BHANU Disp 1  Also lancets and strips for bid testing 100 RF x3 1 Device 0    Lancets MISC 1 each by Does not apply route daily 100 each 3    Blood Glucose Monitoring Suppl BHANU Disp 1 for diabetes 1 Device 0    ACCU-CHEK SOFTCLIX LANCETS MISC Twice daily sugars check 100 each 3             Past Medical History:   Diagnosis Date    Abnormal chest CT 7/23/2019    ILd ?  Accident caused by powered      left foot. injury     Hyperlipidemia     Hypertension     Obesity, Class I, BMI 30-34.9 10/23/2018    Type II or unspecified type diabetes mellitus without mention of complication, not stated as uncontrolled     Vertigo 9/24/2019       Past Surgical History:   Procedure Laterality Date    COLONOSCOPY      FINGER TRIGGER RELEASE      thumb right hand    HYSTERECTOMY      SKIN GRAFT               Family History   Problem Relation Age of Onset    Diabetes Mother     Heart Disease Mother     Heart Disease Father     Cancer Brother         prostate    Heart Disease Brother     Heart Disease Brother     Heart Disease Brother     Hypertension Other            Objective     BP (!) 142/75   Pulse 65   Resp 12   Ht 5' 4\" (1.626 m)   Wt 188 lb (85.3 kg)   SpO2 93% Comment: RA  BMI 32.27 kg/m²     @LASTSAO2(3)@    Wt Readings from Last 3 Encounters:   07/01/20 188 lb (85.3 kg)   09/24/19 189 lb (85.7 kg)   07/23/19 189 lb (85.7 kg)       Physical Exam     NAD alert and cooperative  HEENT: Edentulous. Throat is clear. Good upstroke of the carotids. No bruits. Mildly dry pharynx. Slightly crowded hypopharynx. Lungs are clear today. I do not detect any dry rales at the bases. Cardiovascular exam regular rate and rhythm without any murmur click. Positive obesity without any hepatosplenomegaly or abdominal mass. Diminished but present pulses lower extremities. Partial amputation forefoot on the left. Callus the heel.   Good sensation to monofilament. No maceration. Thickened nail. No suspicious skin lesions or nodules. Chemistry        Component Value Date/Time     07/01/2020 1351    K 4.6 07/01/2020 1351    CL 98 (L) 07/01/2020 1351    CO2 23 07/01/2020 1351    BUN 21 (H) 07/01/2020 1351    CREATININE 0.9 07/01/2020 1351        Component Value Date/Time    CALCIUM 9.5 07/01/2020 1351    ALKPHOS 66 07/01/2020 1351    AST 32 07/01/2020 1351    ALT 29 07/01/2020 1351    BILITOT 0.4 07/01/2020 1351            Lab Results   Component Value Date    WBC 10.6 04/06/2013    HGB 12.1 04/06/2013    HCT 37.5 04/06/2013    MCV 87.4 04/06/2013     04/06/2013     Lab Results   Component Value Date    LABA1C 9.2 07/01/2020     Lab Results   Component Value Date    .7 10/07/2017     Lab Results   Component Value Date    LABA1C 9.2 07/01/2020     No components found for: CHLPL  Lab Results   Component Value Date    TRIG 196 (H) 10/23/2018    TRIG 139 10/07/2017    TRIG 127 04/05/2017     Lab Results   Component Value Date    HDL 38 (L) 09/24/2019    HDL 35 (L) 10/23/2018    HDL 41 10/07/2017     Lab Results   Component Value Date    LDLCALC 48 09/24/2019    LDLCALC 52 10/23/2018    LDLCALC 60 10/07/2017     Lab Results   Component Value Date    LABVLDL 33 09/24/2019    LABVLDL 39 10/23/2018    LABVLDL 28 10/07/2017       Old labs and records reviewed or requested  Discussed past lab and studies with patient   A1c is 8     Diagnosis Orders   1. Uncontrolled type 2 diabetes mellitus with hyperglycemia (HCC)  Diabetic Foot Exam    POCT glycosylated hemoglobin (Hb A1C)    Lipid Panel    glipiZIDE (GLUCOTROL) 10 MG tablet   2. Obesity, Class I, BMI 30-34.9     3. Essential hypertension  Lipid Panel   4. Pure hypercholesterolemia  Lipid Panel   5. History of uterine cancer     6. History of colonic polyps     7.  Need for influenza vaccination  INFLUENZA, QUADV, ADJUVANTED, 65 YRS =, IM, PF, PREFILL SYR, 0.5ML (FLUAD)     Beatties improved. A1c of 8. Continue current management. Discussed decreasing calories 10 pound weight loss before next visit. Hypertension. Borderline control. If home pressures continue above 140 increase losartan to 100 mg. Hyperlipidemia. Lipid profile today. History of uterine cancer and colonic polyps. Follow-up with specialist.    Due flu vaccine and tetanus. Flu was given. Will have tetanus as an outpatient. Financial constraints. Hopefully insulin will be more affordable in the near future. There are no diagnoses linked to this encounter. No follow-ups on file. Diagnosis and treatment discussed.   Possible side effects of medication reviewed  Patients questions answered  Follow up understood  Pt aware if they are not contacted about any test results , this does not mean they are normal.  They should call

## 2020-10-05 NOTE — PATIENT INSTRUCTIONS
Nice improvement on your A1c. Continue with your active life. Goal of 30 minutes exercise or activity daily. Continue to cut back on your calories. I would like you to lose 10 pounds prior to your next visit. Continue on with your specialist and schedule your colonoscopy when you are able. See you when you get back from [place].     Consider getting your Tdap vaccine to protect from whooping cough and tetanus    If blood pressures consistently greater than 648 systolic I would like you to increase your losartan to 100 mg this would be 2 of your 50s and then we would switch your refills to 100

## 2020-10-21 DIAGNOSIS — Z79.4 TYPE 2 DIABETES MELLITUS WITH HYPERGLYCEMIA, WITH LONG-TERM CURRENT USE OF INSULIN (HCC): Primary | ICD-10-CM

## 2020-10-21 DIAGNOSIS — E11.65 TYPE 2 DIABETES MELLITUS WITH HYPERGLYCEMIA, WITH LONG-TERM CURRENT USE OF INSULIN (HCC): Primary | ICD-10-CM

## 2020-10-21 RX ORDER — INSULIN DETEMIR 100 [IU]/ML
INJECTION, SOLUTION SUBCUTANEOUS
Qty: 45 ML | Refills: 3 | Status: SHIPPED | OUTPATIENT
Start: 2020-10-21 | End: 2021-11-10 | Stop reason: SDUPTHER

## 2020-12-03 ENCOUNTER — TELEPHONE (OUTPATIENT)
Dept: FAMILY MEDICINE CLINIC | Age: 75
End: 2020-12-03

## 2020-12-03 RX ORDER — CALCIUM CITRATE/VITAMIN D3 200MG-6.25
TABLET ORAL
Qty: 100 STRIP | Refills: 0 | Status: SHIPPED | OUTPATIENT
Start: 2020-12-03 | End: 2020-12-09 | Stop reason: SDUPTHER

## 2020-12-03 NOTE — TELEPHONE ENCOUNTER
PT called in requesting a refill for her test strips. PT says that her pharmacy was supposed to have sent the request over. Please send to the [facility] in [place].

## 2020-12-09 RX ORDER — CALCIUM CITRATE/VITAMIN D3 200MG-6.25
TABLET ORAL
Qty: 100 STRIP | Refills: 0 | Status: SHIPPED | OUTPATIENT
Start: 2020-12-09 | End: 2021-03-23 | Stop reason: SDUPTHER

## 2020-12-09 NOTE — TELEPHONE ENCOUNTER
PT called in regarding her test strips. PT says that they were meant to go to [facility] in [place] and not [facility]. PT would like to know if they can be resent to the correct pharmacy.

## 2020-12-14 ENCOUNTER — TELEPHONE (OUTPATIENT)
Dept: FAMILY MEDICINE CLINIC | Age: 75
End: 2020-12-14

## 2020-12-14 RX ORDER — METOPROLOL TARTRATE 50 MG/1
TABLET, FILM COATED ORAL
Qty: 180 TABLET | Refills: 1 | Status: SHIPPED | OUTPATIENT
Start: 2020-12-14 | End: 2021-05-24

## 2020-12-14 RX ORDER — LOSARTAN POTASSIUM 50 MG/1
TABLET ORAL
Qty: 90 TABLET | Refills: 1 | Status: SHIPPED | OUTPATIENT
Start: 2020-12-14 | End: 2021-05-24

## 2020-12-14 RX ORDER — ATORVASTATIN CALCIUM 10 MG/1
TABLET, FILM COATED ORAL
Qty: 90 TABLET | Refills: 1 | Status: SHIPPED | OUTPATIENT
Start: 2020-12-14 | End: 2021-05-24

## 2020-12-16 NOTE — TELEPHONE ENCOUNTER
Pharmacies updated.  Pt only uses [facility] in [facility] for test strips and [facility] for all other med's

## 2020-12-16 NOTE — TELEPHONE ENCOUNTER
Left vm with patient to confirm [facility]. Please confirm all pharmacies she wants in her chart (for short term meds as well) and delete unused.

## 2020-12-16 NOTE — TELEPHONE ENCOUNTER
PT called in regarding refill for her medications. PT says that its supposed to go to [facility] and that they have not received her refills from our office. The number she gave for us to call is [phone].

## 2021-03-07 DIAGNOSIS — E11.65 UNCONTROLLED TYPE 2 DIABETES MELLITUS WITH HYPERGLYCEMIA (HCC): ICD-10-CM

## 2021-03-08 RX ORDER — GLIPIZIDE 10 MG/1
TABLET ORAL
Qty: 360 TABLET | Refills: 0 | Status: SHIPPED | OUTPATIENT
Start: 2021-03-08 | End: 2021-05-25

## 2021-03-23 DIAGNOSIS — E11.69 TYPE 2 DIABETES MELLITUS WITH OTHER SPECIFIED COMPLICATION, UNSPECIFIED WHETHER LONG TERM INSULIN USE (HCC): ICD-10-CM

## 2021-03-23 RX ORDER — CALCIUM CITRATE/VITAMIN D3 200MG-6.25
TABLET ORAL
Qty: 100 STRIP | Refills: 0 | Status: SHIPPED | OUTPATIENT
Start: 2021-03-23 | End: 2021-04-05 | Stop reason: SDUPTHER

## 2021-03-30 NOTE — PROGRESS NOTES
HPI: Latoya Bryant presents for follow-up. Chronic health issues include obesity, hypertension, history uterine cancer, diabetes, abnormal CT chest,colonic poyps    DM2 with microalbuminemia. A1c of 8 October 2020. Metformin 850 twice daily, glipizide 10 in the morning 30 before supper. 42 units of insulin. Is starting to walk. Weight is down 4 pounds. One low sugar. Occasional lower extremity edema. Check sugars. Some pain lateral left foot. Partial amputation callus from old injury. No paresthesias. No visual complaints. Insulin is expensive. Victoza and Jardiance cost prohibited as well. No diarrhea. Positive ARB and baby aspirin.       Hypertension.  Follows with cardiology.  Stress echo 2013 with ejection fraction 55% and left ventricular hypertrophy. Metoprolol 50 twice daily, losartan 50, denies any chest pain palpitations current lower extremity edema lower extremity edema. Blood pressure 120s at home. Low-salt diet since has been recently diagnosed with congestive heart failure.      Pulmonary function with restrictive disease and CT consistent with interstitial lung disease within the dependent portions.  States that her shortness of breath is stable. Positive snoring no known apnea.  Negative NEGRITA double-stranded DNA RNP scleroderma anti-Sears and ANCA. no aspiration seen on swallow study remotely.  No inhalers.  No current edema.  No hemoptysis, sputum, or chronic postnasal drip.     Hx  uterine cancer. Negative LV S1, pelvic washings and lymph nodes. 18 mm 20 mm depth of invasion. Follows  Total hysterectomy BSO, lymph nodes, uterosacral ligament colpopexy  radiation therapy follows up yearly with oncology.  GYN exam mammogram 9.2020 . 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 Follows dietary yearly. Positive callus     Colonic polyp. Overdue recheck 2020. No rectal bleeding or change in stools.        Specialist  [facility]    Podiatrist    Oncologist.      GI         PMH:    Total hyst for cancer     Partial amputation foot     SH: No current tobacco or alcohol. .  33 years. Retired [employer]ope ran [employer] 9. Travels to [place] late October till April. [address], fishing, gardening. Starting to walk     FH: 7 brothers 2 sisters    + brother colon cancer 79,     - DM, CAD      review of systems: . Denies any hearing loss or falls. No sleep apnea. Patient will vertigo when she rolls over in bed. No current sinus symptoms. Denies any wheezing pneumonias. Does have shortness of breath with exertion abnormal CT chest and restrictive lung disease on pulmonary function. . No progression in symptoms.  No chest pain palpitations.  Transient lower extremity edema. Rare GE reflux. Occasional constipation. History of proteinuria now resolved. . Current urine complaints. No vaginal bleeding. Denies any joint issues other than she will amputation left foot. No falls.  Has some difficulty walking secondary to discomfort.      Constitutional, ent, CV, respiratory, GI, , joint, skin, allergic and psychiatric ROS reviewed and negative except for above    No Known Allergies    Outpatient Medications Marked as Taking for the 3/31/21 encounter (Office Visit) with Hussein Mack MD   Medication Sig Dispense Refill    glipiZIDE (GLUCOTROL) 10 MG tablet TAKE 1 TABLET BEFORE       BREAKFAST AND 3 TABLETS    BEFORE SUPPER 360 tablet 0    metFORMIN (GLUCOPHAGE) 850 MG tablet TAKE 1 TABLET TWICE DAILY  WITH MEALS 180 tablet 1    atorvastatin (LIPITOR) 10 MG tablet TAKE 1 TABLET DAILY 90 tablet 1    metoprolol tartrate (LOPRESSOR) 50 MG tablet TAKE 1 TABLET TWICE A  tablet 1    losartan (COZAAR) 50 MG tablet 1 po q day to replace 2 25 mg tablets 90 tablet 1    insulin detemir (LEVEMIR FLEXTOUCH) 100 UNIT/ML injection pen 42 units sc q 24 hours 45 mL 3    aspirin 81 MG EC tablet Take 1 tablet by mouth daily 90 tablet 3    loratadine (CLARITIN) 10 MG capsule 1 po q day 90 capsule 3             Past Medical History:   Diagnosis Date    Abnormal chest CT 7/23/2019    ILd ?  Accident caused by powered      left foot. injury     Hyperlipidemia     Hypertension     Obesity, Class I, BMI 30-34.9 10/23/2018    Type II or unspecified type diabetes mellitus without mention of complication, not stated as uncontrolled     Vertigo 9/24/2019       Past Surgical History:   Procedure Laterality Date    COLONOSCOPY      FINGER TRIGGER RELEASE      thumb right hand    HYSTERECTOMY      SKIN GRAFT               Family History   Problem Relation Age of Onset    Diabetes Mother     Heart Disease Mother     Heart Disease Father     Cancer Brother         prostate    Heart Disease Brother     Heart Disease Brother     Heart Disease Brother     Hypertension Other              Objective     BP (!) 150/78   Pulse 67   Temp 96.7 °F (35.9 °C)   Resp 12   Ht 5' 4\" (1.626 m)   Wt 185 lb (83.9 kg)   SpO2 96% Comment: RA  BMI 31.76 kg/m²     @LASTSAO2(3)@    Wt Readings from Last 3 Encounters:   10/05/20 188 lb (85.3 kg)   07/01/20 188 lb (85.3 kg)   09/24/19 189 lb (85.7 kg)       Physical Exam     NAD alert and cooperative  HEENT: Is clear. No crowded hypopharynx. Scant cerumen. Wears glasses. Good upstroke of the carotids no bruits. Dry rales base bilaterally. No wheeze. Cardiovascular exam regular rate and rhythm without any murmur click. No gallop or S4. Breast without any dominant masses discharge or dimpling. Obesity no abdominal mass or hepatosplenomegaly. Amputation great toe left foot positive callus. Sensation intact. No maceration. Pain just below the lateral malleolus on the left. Diminished range of motion. Positive pulses lower extremities. No lower extremity edema.     Chemistry        Component Value Date/Time     07/01/2020 1351    K 4.6 07/01/2020 1351    CL 98 (L) 07/01/2020 1351 CO2 23 07/01/2020 1351    BUN 21 (H) 07/01/2020 1351    CREATININE 0.9 07/01/2020 1351        Component Value Date/Time    CALCIUM 9.5 07/01/2020 1351    ALKPHOS 66 07/01/2020 1351    AST 32 07/01/2020 1351    ALT 29 07/01/2020 1351    BILITOT 0.4 07/01/2020 1351            Lab Results   Component Value Date    WBC 10.6 04/06/2013    HGB 12.1 04/06/2013    HCT 37.5 04/06/2013    MCV 87.4 04/06/2013     04/06/2013     Lab Results   Component Value Date    LABA1C 8.0 10/05/2020     Lab Results   Component Value Date    .7 10/07/2017     Lab Results   Component Value Date    LABA1C 8.0 10/05/2020     No components found for: CHLPL  Lab Results   Component Value Date    TRIG 162 (H) 10/05/2020    TRIG 196 (H) 10/23/2018    TRIG 139 10/07/2017     Lab Results   Component Value Date    HDL 34 (L) 10/05/2020    HDL 38 (L) 09/24/2019    HDL 35 (L) 10/23/2018     Lab Results   Component Value Date    LDLCALC 57 10/05/2020    LDLCALC 48 09/24/2019    LDLCALC 52 10/23/2018     Lab Results   Component Value Date    LABVLDL 32 10/05/2020    LABVLDL 33 09/24/2019    LABVLDL 39 10/23/2018       Old labs and records reviewed or requested  Discussed past lab and studies with patient      Diagnosis Orders   1. Type 2 diabetes mellitus with microalbuminuria, with long-term current use of insulin ([facility])  POCT glycosylated hemoglobin (Hb A1C)    Lipid Panel    Comprehensive Metabolic Panel   2. History of colonic polyps     3. History of radiation therapy     4. History of uterine cancer     5. Pure hypercholesterolemia  Lipid Panel   6. Essential hypertension  Comprehensive Metabolic Panel   7. Obesity, Class I, BMI 30-34.9       A1c improved 7.8. Continue current medications. Consider increasing Metformin and Actos getting rid of insulin if unable to afford. History of colonic polyps due repeat reminded.     History of uterine cancer radiation therapy following with her specialist.    Hyperlipidemia lipid

## 2021-03-31 ENCOUNTER — OFFICE VISIT (OUTPATIENT)
Dept: FAMILY MEDICINE CLINIC | Age: 76
End: 2021-03-31
Payer: MEDICARE

## 2021-03-31 VITALS
DIASTOLIC BLOOD PRESSURE: 78 MMHG | WEIGHT: 185 LBS | SYSTOLIC BLOOD PRESSURE: 150 MMHG | HEIGHT: 64 IN | OXYGEN SATURATION: 96 % | RESPIRATION RATE: 12 BRPM | BODY MASS INDEX: 31.58 KG/M2 | HEART RATE: 67 BPM | TEMPERATURE: 96.7 F

## 2021-03-31 DIAGNOSIS — I10 ESSENTIAL HYPERTENSION: ICD-10-CM

## 2021-03-31 DIAGNOSIS — Z85.42 HISTORY OF UTERINE CANCER: ICD-10-CM

## 2021-03-31 DIAGNOSIS — Z92.3 HISTORY OF RADIATION THERAPY: ICD-10-CM

## 2021-03-31 DIAGNOSIS — C55 MALIGNANT NEOPLASM OF UTERUS, UNSPECIFIED SITE (HCC): ICD-10-CM

## 2021-03-31 DIAGNOSIS — Z79.4 TYPE 2 DIABETES MELLITUS WITH MICROALBUMINURIA, WITH LONG-TERM CURRENT USE OF INSULIN (HCC): Primary | ICD-10-CM

## 2021-03-31 DIAGNOSIS — R80.9 TYPE 2 DIABETES MELLITUS WITH MICROALBUMINURIA, WITH LONG-TERM CURRENT USE OF INSULIN (HCC): Primary | ICD-10-CM

## 2021-03-31 DIAGNOSIS — E11.29 TYPE 2 DIABETES MELLITUS WITH MICROALBUMINURIA, WITH LONG-TERM CURRENT USE OF INSULIN (HCC): Primary | ICD-10-CM

## 2021-03-31 DIAGNOSIS — Z86.010 HISTORY OF COLONIC POLYPS: ICD-10-CM

## 2021-03-31 DIAGNOSIS — E78.00 PURE HYPERCHOLESTEROLEMIA: ICD-10-CM

## 2021-03-31 DIAGNOSIS — E66.9 OBESITY, CLASS I, BMI 30-34.9: ICD-10-CM

## 2021-03-31 PROBLEM — G47.30 SLEEP APNEA: Status: ACTIVE | Noted: 2021-03-31

## 2021-03-31 LAB — HBA1C MFR BLD: 7.6 %

## 2021-03-31 PROCEDURE — 99214 OFFICE O/P EST MOD 30 MIN: CPT | Performed by: INTERNAL MEDICINE

## 2021-03-31 PROCEDURE — G8427 DOCREV CUR MEDS BY ELIG CLIN: HCPCS | Performed by: INTERNAL MEDICINE

## 2021-03-31 PROCEDURE — 3051F HG A1C>EQUAL 7.0%<8.0%: CPT | Performed by: INTERNAL MEDICINE

## 2021-03-31 PROCEDURE — 1090F PRES/ABSN URINE INCON ASSESS: CPT | Performed by: INTERNAL MEDICINE

## 2021-03-31 PROCEDURE — 1123F ACP DISCUSS/DSCN MKR DOCD: CPT | Performed by: INTERNAL MEDICINE

## 2021-03-31 PROCEDURE — G8399 PT W/DXA RESULTS DOCUMENT: HCPCS | Performed by: INTERNAL MEDICINE

## 2021-03-31 PROCEDURE — 4040F PNEUMOC VAC/ADMIN/RCVD: CPT | Performed by: INTERNAL MEDICINE

## 2021-03-31 PROCEDURE — G8484 FLU IMMUNIZE NO ADMIN: HCPCS | Performed by: INTERNAL MEDICINE

## 2021-03-31 PROCEDURE — G8417 CALC BMI ABV UP PARAM F/U: HCPCS | Performed by: INTERNAL MEDICINE

## 2021-03-31 PROCEDURE — 36415 COLL VENOUS BLD VENIPUNCTURE: CPT | Performed by: INTERNAL MEDICINE

## 2021-03-31 PROCEDURE — 1036F TOBACCO NON-USER: CPT | Performed by: INTERNAL MEDICINE

## 2021-03-31 PROCEDURE — 83036 HEMOGLOBIN GLYCOSYLATED A1C: CPT | Performed by: INTERNAL MEDICINE

## 2021-03-31 RX ORDER — ASPIRIN 81 MG/1
TABLET ORAL
COMMUNITY

## 2021-03-31 ASSESSMENT — PATIENT HEALTH QUESTIONNAIRE - PHQ9
SUM OF ALL RESPONSES TO PHQ9 QUESTIONS 1 & 2: 0
SUM OF ALL RESPONSES TO PHQ QUESTIONS 1-9: 0

## 2021-03-31 NOTE — PATIENT INSTRUCTIONS
Lanmauricio robison,tooujeo, basaglar,tresiba other long-acting insulins. We could try increasing your Metformin to thousand twice a day and cutting slowly back on your insulin to see how your sugars are. Continue your exercise. Goal of 20 minutes 5 days weekly. If you continue to have some difficulty with your foot please follow back up with your podiatrist.  I look forward to rechecking your blood pressure with your own cuff to ensure that you are not running high.   Continue follow-up with your specialist.  Elsa Alfredo should get your lab test results within the next week

## 2021-04-01 ENCOUNTER — TELEPHONE (OUTPATIENT)
Dept: FAMILY MEDICINE CLINIC | Age: 76
End: 2021-04-01

## 2021-04-01 LAB
A/G RATIO: 1.3 (ref 1.1–2.2)
ALBUMIN SERPL-MCNC: 4.3 G/DL (ref 3.4–5)
ALP BLD-CCNC: 61 U/L (ref 40–129)
ALT SERPL-CCNC: 21 U/L (ref 10–40)
ANION GAP SERPL CALCULATED.3IONS-SCNC: 15 MMOL/L (ref 3–16)
AST SERPL-CCNC: 25 U/L (ref 15–37)
BILIRUB SERPL-MCNC: 0.5 MG/DL (ref 0–1)
BUN BLDV-MCNC: 19 MG/DL (ref 7–20)
CALCIUM SERPL-MCNC: 9.7 MG/DL (ref 8.3–10.6)
CHLORIDE BLD-SCNC: 102 MMOL/L (ref 99–110)
CHOLESTEROL, TOTAL: 140 MG/DL (ref 0–199)
CO2: 26 MMOL/L (ref 21–32)
CREAT SERPL-MCNC: 1 MG/DL (ref 0.6–1.2)
GFR AFRICAN AMERICAN: >60
GFR NON-AFRICAN AMERICAN: 54
GLOBULIN: 3.3 G/DL
GLUCOSE BLD-MCNC: 122 MG/DL (ref 70–99)
HDLC SERPL-MCNC: 31 MG/DL (ref 40–60)
LDL CHOLESTEROL CALCULATED: 67 MG/DL
POTASSIUM SERPL-SCNC: 4.3 MMOL/L (ref 3.5–5.1)
SODIUM BLD-SCNC: 143 MMOL/L (ref 136–145)
TOTAL PROTEIN: 7.6 G/DL (ref 6.4–8.2)
TRIGL SERPL-MCNC: 212 MG/DL (ref 0–150)
VLDLC SERPL CALC-MCNC: 42 MG/DL

## 2021-04-01 NOTE — TELEPHONE ENCOUNTER
Returning call for results. When reading message on result note, pt has questions: what does is mean \" mild decrease in kidney function\" please advise.  Please call Radha Zamarripa back at [phone]

## 2021-04-05 ENCOUNTER — TELEPHONE (OUTPATIENT)
Dept: FAMILY MEDICINE CLINIC | Age: 76
End: 2021-04-05

## 2021-04-05 DIAGNOSIS — E11.69 TYPE 2 DIABETES MELLITUS WITH OTHER SPECIFIED COMPLICATION, UNSPECIFIED WHETHER LONG TERM INSULIN USE (HCC): ICD-10-CM

## 2021-04-05 RX ORDER — CALCIUM CITRATE/VITAMIN D3 200MG-6.25
TABLET ORAL
Qty: 100 STRIP | Refills: 0 | Status: SHIPPED | OUTPATIENT
Start: 2021-04-05 | End: 2021-04-07 | Stop reason: SDUPTHER

## 2021-04-05 NOTE — TELEPHONE ENCOUNTER
PT called in requesting a refill of her test strips to be sent to the [facility] in [facility].

## 2021-04-07 ENCOUNTER — TELEPHONE (OUTPATIENT)
Dept: FAMILY MEDICINE CLINIC | Age: 76
End: 2021-04-07

## 2021-04-07 DIAGNOSIS — E11.9 TYPE 2 DIABETES MELLITUS WITHOUT COMPLICATION, WITH LONG-TERM CURRENT USE OF INSULIN (HCC): ICD-10-CM

## 2021-04-07 DIAGNOSIS — Z79.4 TYPE 2 DIABETES MELLITUS WITHOUT COMPLICATION, WITH LONG-TERM CURRENT USE OF INSULIN (HCC): ICD-10-CM

## 2021-04-07 RX ORDER — CALCIUM CITRATE/VITAMIN D3 200MG-6.25
TABLET ORAL
Qty: 100 STRIP | Refills: 0 | Status: SHIPPED | OUTPATIENT
Start: 2021-04-07 | End: 2021-06-29

## 2021-04-07 NOTE — TELEPHONE ENCOUNTER
[address] called in stating that they have faxed over a Certificate of Medical Necessity form for PT's test strips but they have not received it back. Please fax to: [phone]    Best call back number: [phone]. They also wanted to know how often PT is to use the test strips.

## 2021-04-15 ENCOUNTER — NURSE ONLY (OUTPATIENT)
Dept: FAMILY MEDICINE CLINIC | Age: 76
End: 2021-04-15

## 2021-04-15 VITALS — DIASTOLIC BLOOD PRESSURE: 76 MMHG | SYSTOLIC BLOOD PRESSURE: 142 MMHG | OXYGEN SATURATION: 96 % | HEART RATE: 63 BPM

## 2021-04-15 DIAGNOSIS — I10 ESSENTIAL HYPERTENSION: Primary | ICD-10-CM

## 2021-04-17 DIAGNOSIS — E11.65 UNCONTROLLED TYPE 2 DIABETES MELLITUS WITH HYPERGLYCEMIA (HCC): ICD-10-CM

## 2021-04-19 RX ORDER — PEN NEEDLE, DIABETIC 32GX 5/32"
NEEDLE, DISPOSABLE MISCELLANEOUS
Qty: 100 EACH | Refills: 0 | Status: SHIPPED | OUTPATIENT
Start: 2021-04-19 | End: 2021-07-06

## 2021-05-23 DIAGNOSIS — E11.65 UNCONTROLLED TYPE 2 DIABETES MELLITUS WITH HYPERGLYCEMIA (HCC): ICD-10-CM

## 2021-05-23 DIAGNOSIS — E78.00 PURE HYPERCHOLESTEROLEMIA: ICD-10-CM

## 2021-05-23 DIAGNOSIS — I10 ESSENTIAL HYPERTENSION: ICD-10-CM

## 2021-05-24 DIAGNOSIS — E11.65 UNCONTROLLED TYPE 2 DIABETES MELLITUS WITH HYPERGLYCEMIA (HCC): ICD-10-CM

## 2021-05-24 RX ORDER — LOSARTAN POTASSIUM 50 MG/1
TABLET ORAL
Qty: 90 TABLET | Refills: 1 | Status: SHIPPED | OUTPATIENT
Start: 2021-05-24 | End: 2021-06-01 | Stop reason: SDUPTHER

## 2021-05-24 RX ORDER — METOPROLOL TARTRATE 50 MG/1
TABLET, FILM COATED ORAL
Qty: 180 TABLET | Refills: 1 | Status: SHIPPED | OUTPATIENT
Start: 2021-05-24 | End: 2021-11-01

## 2021-05-24 RX ORDER — ATORVASTATIN CALCIUM 10 MG/1
TABLET, FILM COATED ORAL
Qty: 90 TABLET | Refills: 1 | Status: SHIPPED | OUTPATIENT
Start: 2021-05-24 | End: 2021-11-01

## 2021-05-25 RX ORDER — GLIPIZIDE 10 MG/1
TABLET ORAL
Qty: 360 TABLET | Refills: 0 | Status: SHIPPED | OUTPATIENT
Start: 2021-05-25 | End: 2021-08-16

## 2021-06-01 ENCOUNTER — OFFICE VISIT (OUTPATIENT)
Dept: FAMILY MEDICINE CLINIC | Age: 76
End: 2021-06-01
Payer: MEDICARE

## 2021-06-01 VITALS
WEIGHT: 181.2 LBS | DIASTOLIC BLOOD PRESSURE: 71 MMHG | SYSTOLIC BLOOD PRESSURE: 144 MMHG | BODY MASS INDEX: 31.1 KG/M2 | HEART RATE: 66 BPM | OXYGEN SATURATION: 97 %

## 2021-06-01 DIAGNOSIS — E11.9 TYPE 2 DIABETES MELLITUS WITHOUT COMPLICATION, WITH LONG-TERM CURRENT USE OF INSULIN (HCC): ICD-10-CM

## 2021-06-01 DIAGNOSIS — E78.00 PURE HYPERCHOLESTEROLEMIA: ICD-10-CM

## 2021-06-01 DIAGNOSIS — I10 ESSENTIAL HYPERTENSION: Primary | ICD-10-CM

## 2021-06-01 DIAGNOSIS — Z79.4 TYPE 2 DIABETES MELLITUS WITHOUT COMPLICATION, WITH LONG-TERM CURRENT USE OF INSULIN (HCC): ICD-10-CM

## 2021-06-01 DIAGNOSIS — Z86.010 HISTORY OF COLONIC POLYPS: ICD-10-CM

## 2021-06-01 DIAGNOSIS — H61.892 NODULE OF EXTERNAL EAR, LEFT: ICD-10-CM

## 2021-06-01 DIAGNOSIS — E66.9 OBESITY, CLASS I, BMI 30-34.9: ICD-10-CM

## 2021-06-01 DIAGNOSIS — Z85.42 HISTORY OF UTERINE CANCER: ICD-10-CM

## 2021-06-01 PROCEDURE — 1123F ACP DISCUSS/DSCN MKR DOCD: CPT | Performed by: INTERNAL MEDICINE

## 2021-06-01 PROCEDURE — G8399 PT W/DXA RESULTS DOCUMENT: HCPCS | Performed by: INTERNAL MEDICINE

## 2021-06-01 PROCEDURE — 1036F TOBACCO NON-USER: CPT | Performed by: INTERNAL MEDICINE

## 2021-06-01 PROCEDURE — 3051F HG A1C>EQUAL 7.0%<8.0%: CPT | Performed by: INTERNAL MEDICINE

## 2021-06-01 PROCEDURE — 4040F PNEUMOC VAC/ADMIN/RCVD: CPT | Performed by: INTERNAL MEDICINE

## 2021-06-01 PROCEDURE — 1090F PRES/ABSN URINE INCON ASSESS: CPT | Performed by: INTERNAL MEDICINE

## 2021-06-01 PROCEDURE — G8417 CALC BMI ABV UP PARAM F/U: HCPCS | Performed by: INTERNAL MEDICINE

## 2021-06-01 PROCEDURE — 99213 OFFICE O/P EST LOW 20 MIN: CPT | Performed by: INTERNAL MEDICINE

## 2021-06-01 PROCEDURE — G8427 DOCREV CUR MEDS BY ELIG CLIN: HCPCS | Performed by: INTERNAL MEDICINE

## 2021-06-01 RX ORDER — LOSARTAN POTASSIUM 50 MG/1
TABLET ORAL
Qty: 180 TABLET | Refills: 0 | Status: SHIPPED | OUTPATIENT
Start: 2021-07-14 | End: 2021-09-13

## 2021-06-01 SDOH — ECONOMIC STABILITY: FOOD INSECURITY: WITHIN THE PAST 12 MONTHS, YOU WORRIED THAT YOUR FOOD WOULD RUN OUT BEFORE YOU GOT MONEY TO BUY MORE.: NEVER TRUE

## 2021-06-01 SDOH — ECONOMIC STABILITY: FOOD INSECURITY: WITHIN THE PAST 12 MONTHS, THE FOOD YOU BOUGHT JUST DIDN'T LAST AND YOU DIDN'T HAVE MONEY TO GET MORE.: NEVER TRUE

## 2021-06-01 ASSESSMENT — SOCIAL DETERMINANTS OF HEALTH (SDOH): HOW HARD IS IT FOR YOU TO PAY FOR THE VERY BASICS LIKE FOOD, HOUSING, MEDICAL CARE, AND HEATING?: NOT HARD AT ALL

## 2021-06-01 NOTE — PROGRESS NOTES
HPI: Shaylee Mccarthy presents for follow-up blood pressure diabetes. Chronic health issues include obesity, hypertension, history uterine cancer, diabetes, abnormal CT chest,colonic poyps     DM2 with microalbuminemia. A1c of 8 October 2020. Metformin 850 twice daily, glipizide 10 in the morning 30 before supper. 42 units of insulin. Is starting to walk. Weight is down 4 pounds. 3 low sugars over the last 2 months. Transiently cut back on her insulin. .  Occasional lower extremity edema. Check sugars twice daily. .  Some pain lateral left foot. Partial amputation callus from old injury. No paresthesias. No visual complaints. Insulin is expensive. Victoza and Jardiance cost prohibited as well. No diarrhea. Positive ARB and baby aspirin.       Hypertension.  Follows with cardiology.  Stress echo 2013 with ejection fraction 55% and left ventricular hypertrophy. Metoprolol 50 twice daily, losartan 50, denies any chest pain palpitations current lower extremity edema lower extremity edema. Pressures are up 53-51-13-11 at home. Low-salt diet since has been recently diagnosed with congestive heart failure. Mildly decreased GFR last visit. No diuretic       Pulmonary function with restrictive disease and CT consistent with interstitial lung disease within the dependent portions.  States that her shortness of breath is stable. Positive snoring no known apnea.  Negative NEGRITA double-stranded DNA RNP scleroderma anti-Sears and ANCA. no aspiration seen on swallow study remotely.  No inhalers.  No current edema.  No hemoptysis, sputum, or chronic postnasal drip.     Hx  uterine cancer. Negative LV S1, pelvic washings and lymph nodes. 18 mm 20 mm depth of invasion. Follows  Total hysterectomy BSO, lymph nodes, uterosacral ligament colpopexy  radiation therapy follows up yearly with oncology.  GYN exam mammogram 9.2020 . 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 Follows dietary yearly. Positive callus     Colonic polyp. Overdue recheck 2020. No rectal bleeding or change in stools.         Specialist  [facility]    Podiatrist    Oncologist.      GI         PMH:    Total hyst for cancer     Partial amputation foot     SH: No current tobacco or alcohol. .  33 years. Retired [employer]ope ran [employer] 9. Travels to [place] late October till April. [address], fishing, gardening. Starting to walk has been hospitalized twice within the last couple of months with small bowel obstruction and arrhythmia.     FH: 7 brothers 2 sisters    + brother colon cancer 79,     - DM, CAD      review of systems: . Denies any hearing loss or falls. No sleep apnea.    Patient will vertigo when she rolls over in bed.  No current sinus symptoms. Denies any wheezing pneumonias. Does have shortness of breath with exertion abnormal CT chest and restrictive lung disease on pulmonary function. . No progression in symptoms.  No chest pain palpitations.  Transient lower extremity edema. Rare GE reflux. Occasional constipation. History of proteinuria now resolved. Lopez Mangle urine complaints.  No vaginal bleeding. Denies any joint issues other than she will amputation left foot. No falls.  Has some difficulty walking secondary to discomfort.       Constitutional, ent, CV, respiratory, GI, , joint, skin, allergic and psychiatric ROS reviewed and negative except for above    No Known Allergies    Outpatient Medications Marked as Taking for the 6/1/21 encounter (Office Visit) with Claudia Wilson MD   Medication Sig Dispense Refill    glipiZIDE (GLUCOTROL) 10 MG tablet TAKE 1 TABLET BEFORE       BREAKFAST AND 3 TABLETS    BEFORE SUPPER 360 tablet 0    metoprolol tartrate (LOPRESSOR) 50 MG tablet TAKE 1 TABLET TWICE A  tablet 1    metFORMIN (GLUCOPHAGE) 850 MG tablet TAKE 1 TABLET TWICE DAILY  WITH MEALS 180 tablet 1    atorvastatin (LIPITOR) 10 MG tablet TAKE 1 TABLET DAILY 90 tablet 1    [DISCONTINUED] losartan (COZAAR) 50 MG tablet TAKE 1 TABLET DAILY (TO    REPLACE TWO 25 MG TABLETS) 90 tablet 1    BD PEN NEEDLE CIELO U/F 32G X 4 MM MISC USE AND DISCARD 1 PEN      NEEDLE DAILY 100 each 0    blood glucose test strips (TRUE METRIX BLOOD GLUCOSE TEST) strip Bid testing 100 strip 0    aspirin (ASPIRIN EC ADULT LOW STRENGTH) 81 MG EC tablet Aspirin 81 MG Delayed Release Oral Tablet  orally 81.0 1     Active      insulin detemir (LEVEMIR FLEXTOUCH) 100 UNIT/ML injection pen 42 units sc q 24 hours 45 mL 3    aspirin 81 MG EC tablet Take 1 tablet by mouth daily 90 tablet 3    loratadine (CLARITIN) 10 MG capsule 1 po q day 90 capsule 3             Past Medical History:   Diagnosis Date    Abnormal chest CT 7/23/2019    ILd ?  Accident caused by powered      left foot. injury     Hyperlipidemia     Hypertension     Obesity, Class I, BMI 30-34.9 10/23/2018    Type II or unspecified type diabetes mellitus without mention of complication, not stated as uncontrolled     Vertigo 9/24/2019       Past Surgical History:   Procedure Laterality Date    COLONOSCOPY      FINGER TRIGGER RELEASE      thumb right hand    HYSTERECTOMY      SKIN GRAFT               Family History   Problem Relation Age of Onset    Diabetes Mother     Heart Disease Mother     Heart Disease Father     Cancer Brother         prostate    Heart Disease Brother     Heart Disease Brother     Heart Disease Brother     Hypertension Other              Objective     BP (!) 144/71 (Site: Left Upper Arm, Position: Sitting, Cuff Size: Medium Adult)   Pulse 66   Wt 181 lb 3.2 oz (82.2 kg)   SpO2 97%   BMI 31.10 kg/m²     @LASTSAO2(3)@    Wt Readings from Last 3 Encounters:   03/31/21 185 lb (83.9 kg)   10/05/20 188 lb (85.3 kg)   07/01/20 188 lb (85.3 kg)       Physical Exam     NAD alert and cooperative  HEENT: Pink conjunctive a. No proptosis. Throat is clear. Good upstroke of the carotids no bruits. Lungs are clear. Decreased breath sounds no wheezes rales or rhonchi. Cardiovascular Danilo regular rate and rhythm no murmur click. Abdomen obese no hepatosplenomegaly epigastric tenderness or mass. No peripheral edema. Partial amputation forefoot. Good sensation. Callus present. No maceration or ulceration. Nodule top of left ear with regular borders. No scale or verrucoid area    Chemistry        Component Value Date/Time     03/31/2021 1205    K 4.3 03/31/2021 1205     03/31/2021 1205    CO2 26 03/31/2021 1205    BUN 19 03/31/2021 1205    CREATININE 1.0 03/31/2021 1205        Component Value Date/Time    CALCIUM 9.7 03/31/2021 1205    ALKPHOS 61 03/31/2021 1205    AST 25 03/31/2021 1205    ALT 21 03/31/2021 1205    BILITOT 0.5 03/31/2021 1205            Lab Results   Component Value Date    WBC 10.6 04/06/2013    HGB 12.1 04/06/2013    HCT 37.5 04/06/2013    MCV 87.4 04/06/2013     04/06/2013     Lab Results   Component Value Date    LABA1C 7.6 03/31/2021     Lab Results   Component Value Date    .7 10/07/2017     Lab Results   Component Value Date    LABA1C 7.6 03/31/2021     No components found for: CHLPL  Lab Results   Component Value Date    TRIG 212 (H) 03/31/2021    TRIG 162 (H) 10/05/2020    TRIG 196 (H) 10/23/2018     Lab Results   Component Value Date    HDL 31 (L) 03/31/2021    HDL 34 (L) 10/05/2020    HDL 38 (L) 09/24/2019     Lab Results   Component Value Date    LDLCALC 67 03/31/2021    LDLCALC 57 10/05/2020    LDLCALC 48 09/24/2019     Lab Results   Component Value Date    LABVLDL 42 03/31/2021    LABVLDL 32 10/05/2020    LABVLDL 33 09/24/2019       Old labs and records reviewed or requested  Discussed past lab and studies with patient    Diagnosis Orders   1. Essential hypertension     2. Pure hypercholesterolemia     3. History of colonic polyps     4. Nodule of external ear, left     5.  Type 2 diabetes mellitus without complication, with long-term current use of insulin ([address] 6. Obesity, Class I, BMI 30-34.9     7. History of uterine cancer       Hypertension not at goal.  Will increase to twice daily losartan 50. Continue blood pressures. Hyperlipidemia low HDL continue on with her exercise. History of colonic polyp. History of uterine cancer. Following up with specialist    Diabetes mellitus. Too early for A1c. Continue to taper glipizide or insulin as needed. Praise on weight loss. General health maintenance. Discussed Tdap and shingles vaccine. No follow-ups on file. Diagnosis and treatment discussed.   Possible side effects of medication reviewed  Patients questions answered  Follow up understood  Pt aware if they are not contacted about any test results , this does not mean they are normal.  They should call

## 2021-06-01 NOTE — PATIENT INSTRUCTIONS
Increase losartan to twice a day and call with bp results  See you in 2 months for blood work. Follow up with derm.

## 2021-06-29 NOTE — TELEPHONE ENCOUNTER
PT called in requesting refill for her test strips. Please send to the [facility] in [place].

## 2021-07-05 DIAGNOSIS — E11.65 UNCONTROLLED TYPE 2 DIABETES MELLITUS WITH HYPERGLYCEMIA (HCC): ICD-10-CM

## 2021-07-06 RX ORDER — PEN NEEDLE, DIABETIC 32GX 5/32"
NEEDLE, DISPOSABLE MISCELLANEOUS
Qty: 100 EACH | Refills: 0 | Status: SHIPPED | OUTPATIENT
Start: 2021-07-06 | End: 2021-09-22

## 2021-08-14 DIAGNOSIS — E11.65 UNCONTROLLED TYPE 2 DIABETES MELLITUS WITH HYPERGLYCEMIA (HCC): ICD-10-CM

## 2021-08-16 RX ORDER — GLIPIZIDE 10 MG/1
TABLET ORAL
Qty: 360 TABLET | Refills: 0 | Status: SHIPPED | OUTPATIENT
Start: 2021-08-16 | End: 2021-11-02

## 2021-08-31 ENCOUNTER — OFFICE VISIT (OUTPATIENT)
Dept: FAMILY MEDICINE CLINIC | Age: 76
End: 2021-08-31
Payer: MEDICARE

## 2021-08-31 VITALS
HEIGHT: 64 IN | WEIGHT: 177 LBS | HEART RATE: 67 BPM | OXYGEN SATURATION: 96 % | RESPIRATION RATE: 16 BRPM | BODY MASS INDEX: 30.22 KG/M2 | DIASTOLIC BLOOD PRESSURE: 64 MMHG | SYSTOLIC BLOOD PRESSURE: 121 MMHG

## 2021-08-31 DIAGNOSIS — E11.9 TYPE 2 DIABETES MELLITUS WITHOUT COMPLICATION, WITH LONG-TERM CURRENT USE OF INSULIN (HCC): Primary | ICD-10-CM

## 2021-08-31 DIAGNOSIS — Z79.4 TYPE 2 DIABETES MELLITUS WITHOUT COMPLICATION, WITH LONG-TERM CURRENT USE OF INSULIN (HCC): Primary | ICD-10-CM

## 2021-08-31 DIAGNOSIS — Z86.010 HISTORY OF COLONIC POLYPS: ICD-10-CM

## 2021-08-31 DIAGNOSIS — E66.9 OBESITY, CLASS I, BMI 30-34.9: ICD-10-CM

## 2021-08-31 DIAGNOSIS — I10 ESSENTIAL HYPERTENSION: ICD-10-CM

## 2021-08-31 DIAGNOSIS — E78.00 PURE HYPERCHOLESTEROLEMIA: ICD-10-CM

## 2021-08-31 DIAGNOSIS — Z85.42 HISTORY OF UTERINE CANCER: ICD-10-CM

## 2021-08-31 DIAGNOSIS — Z92.3 HISTORY OF RADIATION THERAPY: ICD-10-CM

## 2021-08-31 LAB
ANION GAP SERPL CALCULATED.3IONS-SCNC: 16 MMOL/L (ref 3–16)
BUN BLDV-MCNC: 17 MG/DL (ref 7–20)
CALCIUM SERPL-MCNC: 9.8 MG/DL (ref 8.3–10.6)
CHLORIDE BLD-SCNC: 98 MMOL/L (ref 99–110)
CO2: 25 MMOL/L (ref 21–32)
CREAT SERPL-MCNC: 1 MG/DL (ref 0.6–1.2)
GFR AFRICAN AMERICAN: >60
GFR NON-AFRICAN AMERICAN: 54
GLUCOSE BLD-MCNC: 145 MG/DL (ref 70–99)
HBA1C MFR BLD: 7.4 %
POTASSIUM SERPL-SCNC: 4.7 MMOL/L (ref 3.5–5.1)
SODIUM BLD-SCNC: 139 MMOL/L (ref 136–145)

## 2021-08-31 PROCEDURE — 1036F TOBACCO NON-USER: CPT | Performed by: INTERNAL MEDICINE

## 2021-08-31 PROCEDURE — 36415 COLL VENOUS BLD VENIPUNCTURE: CPT | Performed by: INTERNAL MEDICINE

## 2021-08-31 PROCEDURE — 1090F PRES/ABSN URINE INCON ASSESS: CPT | Performed by: INTERNAL MEDICINE

## 2021-08-31 PROCEDURE — 3051F HG A1C>EQUAL 7.0%<8.0%: CPT | Performed by: INTERNAL MEDICINE

## 2021-08-31 PROCEDURE — G8399 PT W/DXA RESULTS DOCUMENT: HCPCS | Performed by: INTERNAL MEDICINE

## 2021-08-31 PROCEDURE — 99214 OFFICE O/P EST MOD 30 MIN: CPT | Performed by: INTERNAL MEDICINE

## 2021-08-31 PROCEDURE — 4040F PNEUMOC VAC/ADMIN/RCVD: CPT | Performed by: INTERNAL MEDICINE

## 2021-08-31 PROCEDURE — G8427 DOCREV CUR MEDS BY ELIG CLIN: HCPCS | Performed by: INTERNAL MEDICINE

## 2021-08-31 PROCEDURE — 1123F ACP DISCUSS/DSCN MKR DOCD: CPT | Performed by: INTERNAL MEDICINE

## 2021-08-31 PROCEDURE — G8417 CALC BMI ABV UP PARAM F/U: HCPCS | Performed by: INTERNAL MEDICINE

## 2021-08-31 PROCEDURE — 83036 HEMOGLOBIN GLYCOSYLATED A1C: CPT | Performed by: INTERNAL MEDICINE

## 2021-08-31 NOTE — PATIENT INSTRUCTIONS
You may hold on your mammogram until September 2022  Consider shingles and tetanus vaccine  Unfortunately we can give you your flu vaccine today. I anticipate we will have flu vaccine availability for injection bring back in your fit test.  If you have blood on your colon screening we really need to do your colonoscopy this year despite Covid. Excellent job with your weight loss and exercise. Blood pressures are running low back. Losartan to 1-1/2 daily. See you in March.

## 2021-09-09 ENCOUNTER — NURSE ONLY (OUTPATIENT)
Dept: FAMILY MEDICINE CLINIC | Age: 76
End: 2021-09-09
Payer: MEDICARE

## 2021-09-09 ENCOUNTER — TELEPHONE (OUTPATIENT)
Dept: FAMILY MEDICINE CLINIC | Age: 76
End: 2021-09-09

## 2021-09-09 DIAGNOSIS — Z86.010 HISTORY OF COLONIC POLYPS: ICD-10-CM

## 2021-09-09 LAB
CONTROL: NORMAL
HEMOCCULT STL QL: NORMAL

## 2021-09-09 PROCEDURE — 82274 ASSAY TEST FOR BLOOD FECAL: CPT | Performed by: INTERNAL MEDICINE

## 2021-09-13 RX ORDER — LOSARTAN POTASSIUM 50 MG/1
TABLET ORAL
Qty: 180 TABLET | Refills: 0 | Status: SHIPPED | OUTPATIENT
Start: 2021-09-13 | End: 2021-12-17 | Stop reason: SDUPTHER

## 2021-10-30 DIAGNOSIS — E11.65 UNCONTROLLED TYPE 2 DIABETES MELLITUS WITH HYPERGLYCEMIA (HCC): ICD-10-CM

## 2021-10-30 DIAGNOSIS — I10 ESSENTIAL HYPERTENSION: ICD-10-CM

## 2021-10-30 DIAGNOSIS — E78.00 PURE HYPERCHOLESTEROLEMIA: ICD-10-CM

## 2021-11-01 DIAGNOSIS — E11.65 UNCONTROLLED TYPE 2 DIABETES MELLITUS WITH HYPERGLYCEMIA (HCC): ICD-10-CM

## 2021-11-01 RX ORDER — ATORVASTATIN CALCIUM 10 MG/1
TABLET, FILM COATED ORAL
Qty: 90 TABLET | Refills: 1 | Status: SHIPPED | OUTPATIENT
Start: 2021-11-01 | End: 2022-04-05

## 2021-11-01 RX ORDER — METOPROLOL TARTRATE 50 MG/1
TABLET, FILM COATED ORAL
Qty: 180 TABLET | Refills: 1 | Status: SHIPPED | OUTPATIENT
Start: 2021-11-01 | End: 2022-04-05

## 2021-11-02 RX ORDER — GLIPIZIDE 10 MG/1
TABLET ORAL
Qty: 360 TABLET | Refills: 0 | Status: SHIPPED | OUTPATIENT
Start: 2021-11-02 | End: 2022-01-19

## 2021-11-09 DIAGNOSIS — E11.65 TYPE 2 DIABETES MELLITUS WITH HYPERGLYCEMIA, WITH LONG-TERM CURRENT USE OF INSULIN (HCC): ICD-10-CM

## 2021-11-09 DIAGNOSIS — Z79.4 TYPE 2 DIABETES MELLITUS WITH HYPERGLYCEMIA, WITH LONG-TERM CURRENT USE OF INSULIN (HCC): ICD-10-CM

## 2021-11-10 RX ORDER — INSULIN DETEMIR 100 [IU]/ML
INJECTION, SOLUTION SUBCUTANEOUS
Qty: 45 ML | Refills: 3 | Status: SHIPPED | OUTPATIENT
Start: 2021-11-10 | End: 2021-11-23

## 2021-11-12 ENCOUNTER — TELEPHONE (OUTPATIENT)
Dept: FAMILY MEDICINE CLINIC | Age: 76
End: 2021-11-12

## 2021-11-12 DIAGNOSIS — E11.65 TYPE 2 DIABETES MELLITUS WITH HYPERGLYCEMIA, WITH LONG-TERM CURRENT USE OF INSULIN (HCC): ICD-10-CM

## 2021-11-12 DIAGNOSIS — Z79.4 TYPE 2 DIABETES MELLITUS WITH HYPERGLYCEMIA, WITH LONG-TERM CURRENT USE OF INSULIN (HCC): ICD-10-CM

## 2021-11-12 RX ORDER — INSULIN GLARGINE 100 [IU]/ML
INJECTION, SOLUTION SUBCUTANEOUS
Qty: 5 PEN | Refills: 3 | Status: SHIPPED | OUTPATIENT
Start: 2021-11-12 | End: 2022-09-06

## 2021-11-12 NOTE — TELEPHONE ENCOUNTER
Please contact or have patient contact her mail order pharmacy to see what other insulins like the Levemir are on formulary plan. I do not see samples for this insulin in fridge.

## 2021-11-12 NOTE — TELEPHONE ENCOUNTER
Rx for insulin detemir Flextouch is on  back order. Calling to find out if  Rx alternative you would like to Rx. Please advise.  Please call [facility] back at [phone] ref [phone]

## 2021-11-23 ENCOUNTER — TELEPHONE (OUTPATIENT)
Dept: FAMILY MEDICINE CLINIC | Age: 76
End: 2021-11-23

## 2021-11-23 DIAGNOSIS — Z79.4 TYPE 2 DIABETES MELLITUS WITH HYPERGLYCEMIA, WITH LONG-TERM CURRENT USE OF INSULIN (HCC): ICD-10-CM

## 2021-11-23 DIAGNOSIS — E11.65 TYPE 2 DIABETES MELLITUS WITH HYPERGLYCEMIA, WITH LONG-TERM CURRENT USE OF INSULIN (HCC): ICD-10-CM

## 2021-11-23 RX ORDER — INSULIN DETEMIR 100 [IU]/ML
INJECTION, SOLUTION SUBCUTANEOUS
Qty: 45 ML | Refills: 3 | Status: SHIPPED | OUTPATIENT
Start: 2021-11-23 | End: 2022-09-06

## 2021-11-23 NOTE — TELEPHONE ENCOUNTER
Pt called for an Rx for Leveimir FLexpens, 90 day supply,  Use 42 units daily.      [facility]    Last ov 8-31-21, next ov 3-2-22

## 2021-12-06 ENCOUNTER — TELEPHONE (OUTPATIENT)
Dept: FAMILY MEDICINE CLINIC | Age: 76
End: 2021-12-06

## 2021-12-17 RX ORDER — LOSARTAN POTASSIUM 50 MG/1
TABLET ORAL
Qty: 180 TABLET | Refills: 0 | Status: SHIPPED | OUTPATIENT
Start: 2021-12-17 | End: 2022-03-21

## 2021-12-17 NOTE — TELEPHONE ENCOUNTER
Debbie called and they are needing an updated script for twice daily vs 1 per day. Loaded the correct script.

## 2022-01-18 DIAGNOSIS — E11.65 UNCONTROLLED TYPE 2 DIABETES MELLITUS WITH HYPERGLYCEMIA (HCC): ICD-10-CM

## 2022-01-19 RX ORDER — GLIPIZIDE 10 MG/1
TABLET ORAL
Qty: 360 TABLET | Refills: 0 | Status: SHIPPED | OUTPATIENT
Start: 2022-01-19 | End: 2022-04-08

## 2022-01-19 NOTE — TELEPHONE ENCOUNTER
Medication:   Requested Prescriptions     Pending Prescriptions Disp Refills    glipiZIDE (GLUCOTROL) 10 MG tablet [Pharmacy Med Name: GLIPIZIDE TAB 10MG] 360 tablet 0     Sig: TAKE 1 TABLET BEFORE       BREAKFAST AND 3 TABLETS    BEFORE SUPPER       Last Filled:      Patient Phone Number: [phone] (home) [phone] (work)    Last appt: 8/31/2021   Next appt: 3/2/2022    Last Labs DM:   Lab Results   Component Value Date    LABA1C 7.4 08/31/2021

## 2022-01-21 ENCOUNTER — TELEPHONE (OUTPATIENT)
Dept: FAMILY MEDICINE CLINIC | Age: 77
End: 2022-01-21

## 2022-03-02 ENCOUNTER — OFFICE VISIT (OUTPATIENT)
Dept: FAMILY MEDICINE CLINIC | Age: 77
End: 2022-03-02
Payer: MEDICARE

## 2022-03-02 VITALS
HEART RATE: 67 BPM | HEIGHT: 64 IN | OXYGEN SATURATION: 98 % | SYSTOLIC BLOOD PRESSURE: 138 MMHG | WEIGHT: 182 LBS | BODY MASS INDEX: 31.07 KG/M2 | RESPIRATION RATE: 12 BRPM | DIASTOLIC BLOOD PRESSURE: 88 MMHG

## 2022-03-02 DIAGNOSIS — I10 PRIMARY HYPERTENSION: ICD-10-CM

## 2022-03-02 DIAGNOSIS — Z86.010 HISTORY OF COLONIC POLYPS: ICD-10-CM

## 2022-03-02 DIAGNOSIS — E11.29 TYPE 2 DIABETES MELLITUS WITH MICROALBUMINURIA, WITH LONG-TERM CURRENT USE OF INSULIN (HCC): Primary | ICD-10-CM

## 2022-03-02 DIAGNOSIS — Z12.31 SCREENING MAMMOGRAM FOR BREAST CANCER: ICD-10-CM

## 2022-03-02 DIAGNOSIS — Z79.4 TYPE 2 DIABETES MELLITUS WITH MICROALBUMINURIA, WITH LONG-TERM CURRENT USE OF INSULIN (HCC): Primary | ICD-10-CM

## 2022-03-02 DIAGNOSIS — Z85.42 HISTORY OF UTERINE CANCER: ICD-10-CM

## 2022-03-02 DIAGNOSIS — E78.00 PURE HYPERCHOLESTEROLEMIA: ICD-10-CM

## 2022-03-02 DIAGNOSIS — E66.9 OBESITY, CLASS I, BMI 30-34.9: ICD-10-CM

## 2022-03-02 DIAGNOSIS — R80.9 TYPE 2 DIABETES MELLITUS WITH MICROALBUMINURIA, WITH LONG-TERM CURRENT USE OF INSULIN (HCC): Primary | ICD-10-CM

## 2022-03-02 PROBLEM — G47.30 SLEEP APNEA: Status: RESOLVED | Noted: 2021-03-31 | Resolved: 2022-03-02

## 2022-03-02 LAB
A/G RATIO: 1.5 (ref 1.1–2.2)
ALBUMIN SERPL-MCNC: 4.4 G/DL (ref 3.4–5)
ALP BLD-CCNC: 60 U/L (ref 40–129)
ALT SERPL-CCNC: 18 U/L (ref 10–40)
ANION GAP SERPL CALCULATED.3IONS-SCNC: 17 MMOL/L (ref 3–16)
AST SERPL-CCNC: 19 U/L (ref 15–37)
BILIRUB SERPL-MCNC: 0.5 MG/DL (ref 0–1)
BUN BLDV-MCNC: 18 MG/DL (ref 7–20)
CALCIUM SERPL-MCNC: 9.3 MG/DL (ref 8.3–10.6)
CHLORIDE BLD-SCNC: 99 MMOL/L (ref 99–110)
CHOLESTEROL, FASTING: 136 MG/DL (ref 0–199)
CO2: 23 MMOL/L (ref 21–32)
CREAT SERPL-MCNC: 0.9 MG/DL (ref 0.6–1.2)
CREATININE URINE: 147.3 MG/DL (ref 28–259)
GFR AFRICAN AMERICAN: >60
GFR NON-AFRICAN AMERICAN: >60
GLUCOSE BLD-MCNC: 171 MG/DL (ref 70–99)
HBA1C MFR BLD: 7.7 %
HDLC SERPL-MCNC: 32 MG/DL (ref 40–60)
LDL CHOLESTEROL CALCULATED: 71 MG/DL
MICROALBUMIN UR-MCNC: <1.2 MG/DL
MICROALBUMIN/CREAT UR-RTO: NORMAL MG/G (ref 0–30)
POTASSIUM SERPL-SCNC: 4.4 MMOL/L (ref 3.5–5.1)
SODIUM BLD-SCNC: 139 MMOL/L (ref 136–145)
TOTAL PROTEIN: 7.4 G/DL (ref 6.4–8.2)
TRIGLYCERIDE, FASTING: 167 MG/DL (ref 0–150)
VLDLC SERPL CALC-MCNC: 33 MG/DL

## 2022-03-02 PROCEDURE — G8427 DOCREV CUR MEDS BY ELIG CLIN: HCPCS | Performed by: INTERNAL MEDICINE

## 2022-03-02 PROCEDURE — G8484 FLU IMMUNIZE NO ADMIN: HCPCS | Performed by: INTERNAL MEDICINE

## 2022-03-02 PROCEDURE — G8417 CALC BMI ABV UP PARAM F/U: HCPCS | Performed by: INTERNAL MEDICINE

## 2022-03-02 PROCEDURE — 1036F TOBACCO NON-USER: CPT | Performed by: INTERNAL MEDICINE

## 2022-03-02 PROCEDURE — 1123F ACP DISCUSS/DSCN MKR DOCD: CPT | Performed by: INTERNAL MEDICINE

## 2022-03-02 PROCEDURE — 3051F HG A1C>EQUAL 7.0%<8.0%: CPT | Performed by: INTERNAL MEDICINE

## 2022-03-02 PROCEDURE — 83036 HEMOGLOBIN GLYCOSYLATED A1C: CPT | Performed by: INTERNAL MEDICINE

## 2022-03-02 PROCEDURE — 36415 COLL VENOUS BLD VENIPUNCTURE: CPT | Performed by: INTERNAL MEDICINE

## 2022-03-02 PROCEDURE — 99214 OFFICE O/P EST MOD 30 MIN: CPT | Performed by: INTERNAL MEDICINE

## 2022-03-02 PROCEDURE — G8399 PT W/DXA RESULTS DOCUMENT: HCPCS | Performed by: INTERNAL MEDICINE

## 2022-03-02 PROCEDURE — 1090F PRES/ABSN URINE INCON ASSESS: CPT | Performed by: INTERNAL MEDICINE

## 2022-03-02 PROCEDURE — 4040F PNEUMOC VAC/ADMIN/RCVD: CPT | Performed by: INTERNAL MEDICINE

## 2022-03-02 NOTE — PROGRESS NOTES
HPI: Kisha Quiñones presents for diabetes hypertension. Chronic health issues include obesity, hypertension, history uterine cancer, diabetes, abnormal CT chest,colonic poyps     DM2 with microalbuminemia.  7.4 August 2021.  Metformin 850 twice daily, glipizide 10 in the morning 30 before supper.  Currently up to 42 units of insulin daily. On vacation her sugar ran between 42 and 270. States she was still active and is not aware of any change in diet. Drinks water and Gatorade 0. Check sugar twice daily. Compliant with medication. Negative retinopathy. Positive proteinuria. 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 Positive ARB and aspirin. Weight is up 5 pounds. [place] using Trulicity and discontinuing insulin. Hypertension. Positive cardiology. Stress echo 213 with ejection fraction 55% left ventricular hypertrophy. Compliant with metoprolol twice daily and losartan twice daily. Denies any chest pain palpitations current lower extremity edema or new exercise intolerance. Diagnosis of congestive heart failure in the past.  Mildly decreased GFR. No diuretic         Pulmonary function with restrictive disease and CT consistent with interstitial lung disease within the dependent portions.  States that her shortness of breath is stable. Positive snoring no known apnea.  Negative NEGRITA double-stranded DNA RNP scleroderma anti-Sears and ANCA. no aspiration seen on swallow study remotely.  No inhalers.  No current edema.  No hemoptysis, sputum, or chronic postnasal drip. No inhalers     Hx  uterine cancer.  Negative  pelvic washings and lymph nodes.  18 mm 20 mm depth of invasion. Follows with oncology./ojse ramon duarte . Marissa Rodriguez Total hysterectomy BSO, lymph nodes, uterosacral ligament colpopexy  radiation therapy.  GYN exam mammogram 9.2020 .     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 Follows podiaatry yearly. Positive callus     Colonic polyp.  Overdue recheck 2020.  No rectal bleeding or change in stools.   Negative fit test September 2021     chondrodermatitis. With dermatology 7/2021.         Specialist  [facility]    Podiatrist    Oncologist.      GI         PMH:    Total hyst for cancer     Partial amputation foot     SH: No current tobacco or alcohol. .  34 years. Retired [employer]. Travels to [place] late October till April. [place], fishing, gardening.   Has been with health issues and doing better    FH: 7 brothers 2 sisters    + brother colon cancer 79,     - DM, CAD      review of systems: . Denies any hearing loss or falls. No sleep apnea.    Patient will vertigo when she rolls over in bed.  No current sinus symptoms. Denies any wheezing pneumonias. Does have shortness of breath with exertion abnormal CT chest and restrictive lung disease on pulmonary function. . No progression in symptoms.  No chest pain palpitations.  Transient lower extremity edema. Rare GE reflux. Occasional constipation. History of proteinuria .   No vaginal bleeding. Denies any joint issues other than she will amputation left foot. No falls. Has some difficulty walking secondary to discomfort.       Constitutional, ent, CV, respiratory, GI, , joint, skin, allergic and psychiatric ROS reviewed and negative except for above    No Known Allergies    Outpatient Medications Marked as Taking for the 3/2/22 encounter (Office Visit) with Aime Ang MD   Medication Sig Dispense Refill    glipiZIDE (GLUCOTROL) 10 MG tablet TAKE 1 TABLET BEFORE       BREAKFAST AND 3 TABLETS    BEFORE SUPPER 360 tablet 0    losartan (COZAAR) 50 MG tablet TAKE 1 TABLET TWICE A DAY  FOR BLOOD PRESSURE.  NOTE   CHANGE IN DIRECTIONS 180 tablet 0    insulin detemir (LEVEMIR FLEXTOUCH) 100 UNIT/ML injection pen INJECT 42 UNITS            SUBCUTANEOUSLY EVERY 24    HOURS 45 mL 3    insulin glargine (LANTUS SOLOSTAR) 100 UNIT/ML injection pen 4 units q ac to replace levamir while on back order 5 pen 3    metoprolol tartrate (LOPRESSOR) 50 MG tablet TAKE 1 TABLET TWICE A  tablet 1    metFORMIN (GLUCOPHAGE) 850 MG tablet TAKE 1 TABLET TWICE DAILY  WITH MEALS 180 tablet 1    atorvastatin (LIPITOR) 10 MG tablet TAKE 1 TABLET DAILY 90 tablet 1    BD PEN NEEDLE CIELO U/F 32G X 4 MM MISC USE AND DISCARD 1 PEN      NEEDLE DAILY 100 each 5    blood glucose test strips (TRUE METRIX BLOOD GLUCOSE TEST) strip TEST TWICE DAILY 100 strip 3    aspirin (ASPIRIN EC ADULT LOW STRENGTH) 81 MG EC tablet Aspirin 81 MG Delayed Release Oral Tablet  orally 81.0 1     Active      aspirin 81 MG EC tablet Take 1 tablet by mouth daily 90 tablet 3    loratadine (CLARITIN) 10 MG capsule 1 po q day 90 capsule 3    Blood Glucose Monitoring Suppl BHANU Disp 1  Also lancets and strips for bid testing 100 RF x3 1 Device 0    Lancets MISC 1 each by Does not apply route daily 100 each 3    Blood Glucose Monitoring Suppl BHANU Disp 1 for diabetes 1 Device 0    ACCU-CHEK SOFTCLIX LANCETS MISC Twice daily sugars check 100 each 3             Past Medical History:   Diagnosis Date    Abnormal chest CT 7/23/2019    ILd ?  Accident caused by powered      left foot.  injury     Hyperlipidemia     Hypertension     Nodule of external ear, left 6/1/2021    Obesity, Class I, BMI 30-34.9 10/23/2018    Type 2 diabetes mellitus without complication, with long-term current use of insulin ([facility] [address] 10/20/2015    5.2019 8.5 no microalg    Type II or unspecified type diabetes mellitus without mention of complication, not stated as uncontrolled     Vertigo 9/24/2019       Past Surgical History:   Procedure Laterality Date    COLONOSCOPY      FINGER TRIGGER RELEASE      thumb right hand    HYSTERECTOMY      SKIN GRAFT               Family History   Problem Relation Age of Onset    Diabetes Mother     Heart Disease Mother     Heart Disease Father     Cancer Brother         prostate    Heart Disease Brother     Heart Disease Brother  Heart Disease Brother     Hypertension Other              Objective     /88   Pulse 67   Resp 12   Ht 5' 4\" (1.626 m)   Wt 182 lb (82.6 kg)   SpO2 98% Comment: RA  BMI 31.24 kg/m²     @LASTSAO2(3)@    Wt Readings from Last 3 Encounters:   08/31/21 177 lb (80.3 kg)   06/01/21 181 lb 3.2 oz (82.2 kg)   03/31/21 185 lb (83.9 kg)       Physical Exam     NAD alert and cooperative  HEENT: Dry mouth. TMs unremarkable good hearing. Pink conjunctive a. Good upstroke of the carotids no bruits. Dry rales base bilaterally. Clear upper lung fields. Cardiovascular exam regular rate and rhythm without any murmur click. Breast  Breast without any dominant mass discharge or dimpling. Positive obesity. No point tenderness suprapubic mass or hepatosplenomegaly. Crepitus of the knees. Good pulses lower extremities. Amputation forefoot partial on the left. Callus. No maceration sensation is intact. Sun damaged skin but no suspicious skin lesions  She is appropriate well-groomed. Good eye contact.     Chemistry        Component Value Date/Time     08/31/2021 1029    K 4.7 08/31/2021 1029    CL 98 (L) 08/31/2021 1029    CO2 25 08/31/2021 1029    BUN 17 08/31/2021 1029    CREATININE 1.0 08/31/2021 1029        Component Value Date/Time    CALCIUM 9.8 08/31/2021 1029    ALKPHOS 61 03/31/2021 1205    AST 25 03/31/2021 1205    ALT 21 03/31/2021 1205    BILITOT 0.5 03/31/2021 1205            Lab Results   Component Value Date    WBC 10.6 04/06/2013    HGB 12.1 04/06/2013    HCT 37.5 04/06/2013    MCV 87.4 04/06/2013     04/06/2013     Lab Results   Component Value Date    LABA1C 7.4 08/31/2021     Lab Results   Component Value Date    .7 10/07/2017     Lab Results   Component Value Date    LABA1C 7.4 08/31/2021     No components found for: CHLPL  Lab Results   Component Value Date    TRIG 212 (H) 03/31/2021    TRIG 162 (H) 10/05/2020    TRIG 196 (H) 10/23/2018     Lab Results   Component Value Date    HDL 31 (L) 03/31/2021    HDL 34 (L) 10/05/2020    HDL 38 (L) 09/24/2019     Lab Results   Component Value Date    LDLCALC 67 03/31/2021    LDLCALC 57 10/05/2020    LDLCALC 48 09/24/2019     Lab Results   Component Value Date    LABVLDL 42 03/31/2021    LABVLDL 32 10/05/2020    LABVLDL 33 09/24/2019       Old labs and records reviewed or requested  Discussed past lab and studies with patient    Diagnosis Orders   1. Type 2 diabetes mellitus with microalbuminuria, with long-term current use of insulin (HCC)  POCT glycosylated hemoglobin (Hb A1C)    Microalbumin / Creatinine Urine Ratio    Comprehensive Metabolic Panel   2. Primary hypertension  Comprehensive Metabolic Panel   3. Pure hypercholesterolemia  Lipid, Fasting   4. History of uterine cancer     5. History of colonic polyps     6. Obesity, Class I, BMI 30-34.9     7. Screening mammogram for breast cancer  EDDIE DIGITAL SCREEN W OR WO CAD BILATERAL     A1c 7.7. We will continue with her current medications discussed I think it unlikely that she can discontinue her insulin with the Trulicity but we could try. We will follow back up in 6 months. Will let me know if sugars are running high. Borderline blood pressure check blood pressures at home. Is aware if above 140/90 we need to change medication. Would like it to be a bit lower. Exercise low-salt diet weight loss. Hyperlipidemia lipid profile. History uterine cancer following with her specialist.    Obesity. Discussed weight loss exercise diet. Do mammogram screening. Will call for appointment    Patient states she does not want to have an annual wellness visit through Medicare. Does have a power of  and a living will. No falls. On this date I have spent 40 minutes reviewing previous notes, test results, and face to face with the patient discussing the diagnosis and plan          No follow-ups on file. Diagnosis and treatment discussed.   Possible side effects of medication reviewed  Patients questions answered  Follow up understood  Pt aware if they are not contacted about any test results , this does not mean they are normal.  They should call

## 2022-03-02 NOTE — PATIENT INSTRUCTIONS
Let me know if your sugars are going above 150 or if you were need to go up significantly on your Lantus. Get back on your low-carb diet and weight loss diet. Exercise 20 minutes or more daily. Avoid your carbohydrates. Check your blood pressure twice a day over the next couple of weeks and let me know how they are running. Your goal is 130/80 or less. Continue with your GYN specialist.  Call for your mammogram.  Fredi Perez are due for another: Check in 6 September. Consider having the shingles vaccine and the tetanus vaccine  See you in 6 months  Patient Education        Learning About Low-Carbohydrate Foods  What foods are low in carbohydrate? The foods you eat contain nutrients, such as vitamins and minerals. Carbohydrate is a nutrient. Your body needs the right amount to stay healthy and work as it should. You can use the list below to help you make choices about which foods to eat. Some foods that are lower in carbohydrate include:  Dairy and dairy alternatives  · Cheese  · Cottage cheese  · Cream cheese  · Nut milk (unsweetened)  · Soy milk (unsweetened)  · Yogurt (Greek, plain)  Fruits  · Avocado  · [employer] and other protein foods  · Almonds  · Beef  · Chicken  · Cod  · Eggs  · Halibut  · Peanut butter and other nut butters  · Pistachios  · Pork  · Pumpkin seeds  · Tofu  · Trout  · [place]  · [place] ([facility])  · Walnuts  Vegetables  · Broccoli  · Carrots  · Cauliflower  · Green beans  · Mushrooms  · Peppers  · Salad greens  · Spinach  · Tomatoes  Work with your doctor to find out how much of this nutrient you need. Depending on your health, you may need more or less of it in your diet. Where can you learn more? Go to https://Academia.edupejulioeweb.healthQualtrÃ©. org and sign in to your [facility] account. Enter [phone] in the [employer] box to learn more about \"Learning About Low-Carbohydrate Foods. \"     If you do not have an account, please click on the \"Sign Up Now\" link.   Current as of: September 8, 2021               Content Version: 13.1  © 2006-2021 [id number]. Care instructions adapted under license by [facility] ([facility]). If you have questions about a medical condition or this instruction, always ask your healthcare professional. [address] any warranty or liability for your use of this information. Patient Education        dulaglutide  Pronunciation:  DOO la GLOO tide  Brand:  Trulicity Pen  What is the most important information I should know about dulaglutide? You should not use dulaglutide if you have Multiple Endocrine Neoplasia syndrome type 2 (MEN 2), or a personal or family history of medullary thyroid carcinoma (a type of thyroid cancer). Do not use dulaglutide if you are in a state of diabetic ketoacidosis (call your doctor for treatment). In animal studies, dulaglutide caused thyroid tumors or thyroid cancer. It is not known whether these effects would occur in people using regular doses. Ask your doctor about your risk. Call your doctor at once if you have signs of a thyroid tumor, such as swelling or a lump in your neck, trouble swallowing, a hoarse voice, or shortness of breath. What is dulaglutide? Dulaglutide is used together with diet and exercise to improve blood sugar control in adults with type 2 diabetes mellitus. Dulaglutide is also used to help reduce the risk of serious heart problems such as heart attack or stroke in adults who have type 2 diabetes and heart disease. This medicine is not for treating type 1 diabetes. Dulaglutide may also be used for purposes not listed in this medication guide. What should I discuss with my healthcare provider before using dulaglutide?   You should not use dulaglutide if you are allergic to it, or if you have:  · multiple endocrine neoplasia type 2 (tumors in your glands);  · a personal or family history of medullary thyroid carcinoma (a type of thyroid cancer); or  · diabetic ketoacidosis (call your doctor for treatment). Tell your doctor if you have ever had:  · pancreatitis;  · a stomach or intestinal disorder;  · gastroesophageal reflux disease (GERD) or slow digestion;  · eye problems caused by diabetes (retinopathy);  · liver or kidney disease;  · if you also use insulin or oral diabetes medicine; or  · if you have been sick with vomiting or diarrhea. In animal studies, dulaglutide caused thyroid tumors or thyroid cancer. It is not known whether these effects would occur in people using regular doses. Ask your doctor about your risk. It is not known whether this medicine will harm an unborn baby. Tell your doctor if you are pregnant or plan to become pregnant. It may not be safe to breast-feed while using this medicine. Ask your doctor about any risk. Dulaglutide is not approved for use by anyone younger than 25years old. How should I use dulaglutide? Follow all directions on your prescription label and read all medication guides or instruction sheets. Your doctor may occasionally change your dose. Use the medicine exactly as directed. Dulaglutide is injected under the skin once per week. Use dulaglutide on the same day each week at the same time of day. If you change your dosing day, allow at least 3 days to pass between doses. You may use dulaglutide with or without food. Read and carefully follow any Instructions for Use provided with your medicine. Ask your doctor or pharmacist if you do not understand these instructions. Your healthcare provider will show you where on your body to inject dulaglutide. Use a different place each time you give an injection. Do not inject into the same place two times in a row. You may have low blood sugar (hypoglycemia) and feel very hungry, dizzy, irritable, confused, anxious, or shaky. To quickly treat hypoglycemia, eat or drink a fast-acting source of sugar (fruit juice, hard candy, crackers, raisins, or non-diet soda).   Your doctor may prescribe a appropriate, unless specifically indicated otherwise. [facility] drug information does not endorse drugs, diagnose patients or recommend therapy. [facility] drug information is an informational resource designed to assist licensed healthcare practitioners in caring for their patients and/or to serve consumers viewing this service as a supplement to, and not a substitute for, the expertise, skill, knowledge and judgment of healthcare practitioners. The absence of a warning for a given drug or drug combination in no way should be construed to indicate that the drug or drug combination is safe, effective or appropriate for any given patient. [facility] does not assume any responsibility for any aspect of healthcare administered with the aid of information [facility] provides. The information contained herein is not intended to cover all possible uses, directions, precautions, warnings, drug interactions, allergic reactions, or adverse effects. If you have questions about the drugs you are taking, check with your doctor, nurse or pharmacist.  Copyright 7775-5116 167  Dank: 4.02. Revision date: 10/8/2020. Care instructions adapted under license by [facility] ([facility]). If you have questions about a medical condition or this instruction, always ask your healthcare professional. [place] [address] any warranty or liability for your use of this information.

## 2022-03-21 RX ORDER — LOSARTAN POTASSIUM 50 MG/1
TABLET ORAL
Qty: 180 TABLET | Refills: 1 | Status: SHIPPED | OUTPATIENT
Start: 2022-03-21 | End: 2022-08-23

## 2022-04-04 DIAGNOSIS — I10 ESSENTIAL HYPERTENSION: ICD-10-CM

## 2022-04-04 DIAGNOSIS — E78.00 PURE HYPERCHOLESTEROLEMIA: ICD-10-CM

## 2022-04-04 DIAGNOSIS — E11.65 UNCONTROLLED TYPE 2 DIABETES MELLITUS WITH HYPERGLYCEMIA (HCC): ICD-10-CM

## 2022-04-05 RX ORDER — METOPROLOL TARTRATE 50 MG/1
TABLET, FILM COATED ORAL
Qty: 180 TABLET | Refills: 1 | Status: SHIPPED | OUTPATIENT
Start: 2022-04-05 | End: 2022-09-06 | Stop reason: SDUPTHER

## 2022-04-05 RX ORDER — ATORVASTATIN CALCIUM 10 MG/1
TABLET, FILM COATED ORAL
Qty: 90 TABLET | Refills: 1 | Status: SHIPPED | OUTPATIENT
Start: 2022-04-05 | End: 2022-09-06 | Stop reason: SDUPTHER

## 2022-04-05 NOTE — TELEPHONE ENCOUNTER
Medication:   Requested Prescriptions     Pending Prescriptions Disp Refills    metFORMIN (GLUCOPHAGE) 850 MG tablet [Pharmacy Med Name: METFORMIN TAB 850MG] 180 tablet 1     Sig: TAKE 1 TABLET TWICE DAILY  WITH MEALS    metoprolol tartrate (LOPRESSOR) 50 MG tablet [Pharmacy Med Name: Carnella Drain TAR TAB 50MG] 180 tablet 1     Sig: TAKE 1 TABLET TWICE A DAY    atorvastatin (LIPITOR) 10 MG tablet [Pharmacy Med Name: ATORVASTATIN TAB 10MG] 90 tablet 1     Sig: TAKE 1 TABLET DAILY       Last Filled:      Patient Phone Number: [phone] (home) [phone] (work)    Last appt: 3/2/2022   Next appt: 9/6/2022

## 2022-04-07 DIAGNOSIS — E11.65 UNCONTROLLED TYPE 2 DIABETES MELLITUS WITH HYPERGLYCEMIA (HCC): ICD-10-CM

## 2022-04-07 NOTE — TELEPHONE ENCOUNTER
Medication:   Requested Prescriptions     Pending Prescriptions Disp Refills    glipiZIDE (GLUCOTROL) 10 MG tablet [Pharmacy Med Name: GLIPIZIDE TAB 10MG] 360 tablet 0     Sig: TAKE 1 TABLET BEFORE       BREAKFAST AND 3 TABLETS    BEFORE SUPPER       Last Filled:      Patient Phone Number: [phone] (home) [phone] (work)    Last appt: 3/2/2022   Next appt: 9/6/2022    Last Labs DM:   Lab Results   Component Value Date    LABA1C 7.7 03/02/2022

## 2022-04-08 RX ORDER — GLIPIZIDE 10 MG/1
TABLET ORAL
Qty: 360 TABLET | Refills: 0 | Status: SHIPPED | OUTPATIENT
Start: 2022-04-08 | End: 2022-06-29

## 2022-06-28 DIAGNOSIS — E11.65 UNCONTROLLED TYPE 2 DIABETES MELLITUS WITH HYPERGLYCEMIA (HCC): ICD-10-CM

## 2022-06-29 RX ORDER — GLIPIZIDE 10 MG/1
TABLET ORAL
Qty: 360 TABLET | Refills: 0 | Status: SHIPPED | OUTPATIENT
Start: 2022-06-29 | End: 2022-09-06 | Stop reason: SDUPTHER

## 2022-08-11 ENCOUNTER — HOSPITAL ENCOUNTER (OUTPATIENT)
Dept: WOMENS IMAGING | Age: 77
Discharge: HOME | End: 2022-08-11
Payer: MEDICARE

## 2022-08-11 VITALS — HEIGHT: 64 IN | BODY MASS INDEX: 30.73 KG/M2 | WEIGHT: 180 LBS

## 2022-08-11 DIAGNOSIS — Z12.31 SCREENING MAMMOGRAM FOR BREAST CANCER: ICD-10-CM

## 2022-08-11 PROCEDURE — 77063 BREAST TOMOSYNTHESIS BI: CPT

## 2022-08-13 DIAGNOSIS — E11.65 UNCONTROLLED TYPE 2 DIABETES MELLITUS WITH HYPERGLYCEMIA (HCC): ICD-10-CM

## 2022-08-15 RX ORDER — PEN NEEDLE, DIABETIC 32GX 5/32"
NEEDLE, DISPOSABLE MISCELLANEOUS
Qty: 100 EACH | Refills: 3 | Status: SHIPPED | OUTPATIENT
Start: 2022-08-15

## 2022-08-23 RX ORDER — LOSARTAN POTASSIUM 50 MG/1
TABLET ORAL
Qty: 180 TABLET | Refills: 1 | Status: SHIPPED | OUTPATIENT
Start: 2022-08-23

## 2022-08-23 NOTE — TELEPHONE ENCOUNTER
Medication:   Requested Prescriptions     Pending Prescriptions Disp Refills    losartan (COZAAR) 50 MG tablet [Pharmacy Med Name: LOSARTAN TAB 50MG] 180 tablet 1     Sig: TAKE 1 TABLET TWICE A DAY  FOR BLOOD PRESSURE (CHANGE IN DIRECTIONS)        Last Filled:  3/21/2022    Patient Phone Number: [phone] (home) [phone] (work)    Last appt: 3/2/2022   Next appt: 9/6/2022    Last OARRS: No flowsheet data found.
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
## 2022-09-06 ENCOUNTER — OFFICE VISIT (OUTPATIENT)
Dept: FAMILY MEDICINE CLINIC | Age: 77
End: 2022-09-06
Payer: MEDICARE

## 2022-09-06 VITALS
HEART RATE: 89 BPM | WEIGHT: 179 LBS | BODY MASS INDEX: 30.56 KG/M2 | RESPIRATION RATE: 16 BRPM | OXYGEN SATURATION: 96 % | SYSTOLIC BLOOD PRESSURE: 130 MMHG | HEIGHT: 64 IN | DIASTOLIC BLOOD PRESSURE: 80 MMHG

## 2022-09-06 DIAGNOSIS — I10 PRIMARY HYPERTENSION: ICD-10-CM

## 2022-09-06 DIAGNOSIS — Z12.11 SCREEN FOR COLON CANCER: ICD-10-CM

## 2022-09-06 DIAGNOSIS — R80.9 TYPE 2 DIABETES MELLITUS WITH MICROALBUMINURIA, WITH LONG-TERM CURRENT USE OF INSULIN (HCC): ICD-10-CM

## 2022-09-06 DIAGNOSIS — Z85.42 HISTORY OF UTERINE CANCER: ICD-10-CM

## 2022-09-06 DIAGNOSIS — Z79.4 TYPE 2 DIABETES MELLITUS WITH MICROALBUMINURIA, WITH LONG-TERM CURRENT USE OF INSULIN (HCC): ICD-10-CM

## 2022-09-06 DIAGNOSIS — Z86.010 HISTORY OF COLONIC POLYPS: ICD-10-CM

## 2022-09-06 DIAGNOSIS — E11.29 TYPE 2 DIABETES MELLITUS WITH MICROALBUMINURIA, WITH LONG-TERM CURRENT USE OF INSULIN (HCC): ICD-10-CM

## 2022-09-06 DIAGNOSIS — I10 ESSENTIAL HYPERTENSION: ICD-10-CM

## 2022-09-06 DIAGNOSIS — Z23 NEED FOR INFLUENZA VACCINATION: ICD-10-CM

## 2022-09-06 DIAGNOSIS — E66.9 OBESITY, CLASS I, BMI 30-34.9: ICD-10-CM

## 2022-09-06 DIAGNOSIS — E78.00 PURE HYPERCHOLESTEROLEMIA: ICD-10-CM

## 2022-09-06 DIAGNOSIS — E11.65 UNCONTROLLED TYPE 2 DIABETES MELLITUS WITH HYPERGLYCEMIA (HCC): Primary | ICD-10-CM

## 2022-09-06 LAB
ANION GAP SERPL CALCULATED.3IONS-SCNC: 16 MMOL/L (ref 3–16)
BUN BLDV-MCNC: 14 MG/DL (ref 7–20)
CALCIUM SERPL-MCNC: 9.1 MG/DL (ref 8.3–10.6)
CHLORIDE BLD-SCNC: 102 MMOL/L (ref 99–110)
CO2: 24 MMOL/L (ref 21–32)
CREAT SERPL-MCNC: 1.2 MG/DL (ref 0.6–1.2)
GFR AFRICAN AMERICAN: 53
GFR NON-AFRICAN AMERICAN: 43
GLUCOSE BLD-MCNC: 178 MG/DL (ref 70–99)
HBA1C MFR BLD: 8.2 %
POTASSIUM SERPL-SCNC: 4.7 MMOL/L (ref 3.5–5.1)
SODIUM BLD-SCNC: 142 MMOL/L (ref 136–145)

## 2022-09-06 PROCEDURE — G8427 DOCREV CUR MEDS BY ELIG CLIN: HCPCS | Performed by: INTERNAL MEDICINE

## 2022-09-06 PROCEDURE — 83036 HEMOGLOBIN GLYCOSYLATED A1C: CPT | Performed by: INTERNAL MEDICINE

## 2022-09-06 PROCEDURE — 1090F PRES/ABSN URINE INCON ASSESS: CPT | Performed by: INTERNAL MEDICINE

## 2022-09-06 PROCEDURE — 90694 VACC AIIV4 NO PRSRV 0.5ML IM: CPT | Performed by: INTERNAL MEDICINE

## 2022-09-06 PROCEDURE — 1123F ACP DISCUSS/DSCN MKR DOCD: CPT | Performed by: INTERNAL MEDICINE

## 2022-09-06 PROCEDURE — G8399 PT W/DXA RESULTS DOCUMENT: HCPCS | Performed by: INTERNAL MEDICINE

## 2022-09-06 PROCEDURE — G8417 CALC BMI ABV UP PARAM F/U: HCPCS | Performed by: INTERNAL MEDICINE

## 2022-09-06 PROCEDURE — 99214 OFFICE O/P EST MOD 30 MIN: CPT | Performed by: INTERNAL MEDICINE

## 2022-09-06 PROCEDURE — G0008 ADMIN INFLUENZA VIRUS VAC: HCPCS | Performed by: INTERNAL MEDICINE

## 2022-09-06 PROCEDURE — 1036F TOBACCO NON-USER: CPT | Performed by: INTERNAL MEDICINE

## 2022-09-06 PROCEDURE — 3052F HG A1C>EQUAL 8.0%<EQUAL 9.0%: CPT | Performed by: INTERNAL MEDICINE

## 2022-09-06 PROCEDURE — 36415 COLL VENOUS BLD VENIPUNCTURE: CPT | Performed by: INTERNAL MEDICINE

## 2022-09-06 RX ORDER — LOSARTAN POTASSIUM 50 MG/1
TABLET ORAL
Qty: 180 TABLET | Refills: 1 | Status: CANCELLED | OUTPATIENT
Start: 2022-09-06

## 2022-09-06 RX ORDER — INSULIN DETEMIR 100 [IU]/ML
INJECTION, SOLUTION SUBCUTANEOUS
Qty: 20 ML | Refills: 0 | Status: SHIPPED | OUTPATIENT
Start: 2022-09-06

## 2022-09-06 RX ORDER — METOPROLOL TARTRATE 50 MG/1
TABLET, FILM COATED ORAL
Qty: 180 TABLET | Refills: 1 | Status: SHIPPED | OUTPATIENT
Start: 2022-09-06

## 2022-09-06 RX ORDER — IBUPROFEN 200 MG
1 TABLET ORAL DAILY
Qty: 100 EACH | Refills: 3 | Status: SHIPPED | OUTPATIENT
Start: 2022-09-06

## 2022-09-06 RX ORDER — ATORVASTATIN CALCIUM 10 MG/1
TABLET, FILM COATED ORAL
Qty: 90 TABLET | Refills: 1 | Status: SHIPPED | OUTPATIENT
Start: 2022-09-06

## 2022-09-06 RX ORDER — GLIPIZIDE 10 MG/1
TABLET ORAL
Qty: 360 TABLET | Refills: 1 | Status: SHIPPED | OUTPATIENT
Start: 2022-09-06

## 2022-09-06 SDOH — ECONOMIC STABILITY: FOOD INSECURITY: WITHIN THE PAST 12 MONTHS, THE FOOD YOU BOUGHT JUST DIDN'T LAST AND YOU DIDN'T HAVE MONEY TO GET MORE.: NEVER TRUE

## 2022-09-06 SDOH — ECONOMIC STABILITY: FOOD INSECURITY: WITHIN THE PAST 12 MONTHS, YOU WORRIED THAT YOUR FOOD WOULD RUN OUT BEFORE YOU GOT MONEY TO BUY MORE.: NEVER TRUE

## 2022-09-06 ASSESSMENT — PATIENT HEALTH QUESTIONNAIRE - PHQ9
SUM OF ALL RESPONSES TO PHQ9 QUESTIONS 1 & 2: 0
SUM OF ALL RESPONSES TO PHQ QUESTIONS 1-9: 0
1. LITTLE INTEREST OR PLEASURE IN DOING THINGS: 0
2. FEELING DOWN, DEPRESSED OR HOPELESS: 0
SUM OF ALL RESPONSES TO PHQ QUESTIONS 1-9: 0

## 2022-09-06 ASSESSMENT — SOCIAL DETERMINANTS OF HEALTH (SDOH): HOW HARD IS IT FOR YOU TO PAY FOR THE VERY BASICS LIKE FOOD, HOUSING, MEDICAL CARE, AND HEATING?: NOT HARD AT ALL

## 2022-09-06 NOTE — PATIENT INSTRUCTIONS
I recommend making a follow-up visit in about 2 months with one of the doctors below to see if your sugars are doing better. Get back to your exercise program cut back on your calories. I would like you to lose 10 pounds. Immunizations recommended include t Tdap and shingles. I will order your syringes and insulin to your local pharmacy. I think she has graduated from your oncologist.    Your repeat colon test.  Please bring back in stool sample    Thank you for care. The best to you and your family.   Enjoy those past      MD Amalia Reed MD  [facility] internal Medicine  [address] [facility] [address] [facility] [address]  [address] scheduling  [phone]

## 2022-09-06 NOTE — PROGRESS NOTES
Vaccine Information Sheet, \"Influenza - Inactivated\"  given to Jeanette Law, or parent/legal guardian of  Jeanette Law and verbalized understanding. Patient responses:    Have you received any other vaccine within the last 14 days? No  Do you currently have an active infectious or acute respiratory illness or fever? No  Have you ever had a reaction to a flu vaccine? No  Do you have any current illness? No  Have you ever had Guillian Odell Syndrome? No  Do you have a serious allergy to any of the follow: Neomycin, Polymyxin, Thimerosal, eggs or egg products? No      Flu vaccine given per order. Please see immunization tab. Risks and benefits explained. Current VIS given.

## 2022-09-12 ENCOUNTER — TELEPHONE (OUTPATIENT)
Dept: FAMILY MEDICINE CLINIC | Age: 77
End: 2022-09-12

## 2022-09-12 ENCOUNTER — NURSE ONLY (OUTPATIENT)
Dept: FAMILY MEDICINE CLINIC | Age: 77
End: 2022-09-12
Payer: MEDICARE

## 2022-09-12 DIAGNOSIS — Z12.11 SCREEN FOR COLON CANCER: ICD-10-CM

## 2022-09-12 LAB
CONTROL: NORMAL
HEMOCCULT STL QL: NEGATIVE

## 2022-09-12 PROCEDURE — 82274 ASSAY TEST FOR BLOOD FECAL: CPT | Performed by: INTERNAL MEDICINE

## 2022-09-12 NOTE — TELEPHONE ENCOUNTER
Pt brought in stool sample, appointment has been scheduled, and sample has been placed in white stool sample basket in lab

## 2022-11-08 ENCOUNTER — OFFICE VISIT (OUTPATIENT)
Dept: INTERNAL MEDICINE CLINIC | Age: 77
End: 2022-11-08
Payer: MEDICARE

## 2022-11-08 VITALS
RESPIRATION RATE: 16 BRPM | HEART RATE: 62 BPM | SYSTOLIC BLOOD PRESSURE: 132 MMHG | BODY MASS INDEX: 30.56 KG/M2 | HEIGHT: 64 IN | DIASTOLIC BLOOD PRESSURE: 68 MMHG | TEMPERATURE: 97.7 F | WEIGHT: 179 LBS | OXYGEN SATURATION: 95 %

## 2022-11-08 DIAGNOSIS — C55 MALIGNANT NEOPLASM OF UTERUS, UNSPECIFIED SITE (HCC): ICD-10-CM

## 2022-11-08 DIAGNOSIS — Z79.4 TYPE 2 DIABETES MELLITUS WITHOUT COMPLICATION, WITH LONG-TERM CURRENT USE OF INSULIN (HCC): Primary | ICD-10-CM

## 2022-11-08 DIAGNOSIS — E11.22 TYPE 2 DIABETES MELLITUS WITH CHRONIC KIDNEY DISEASE, WITH LONG-TERM CURRENT USE OF INSULIN, UNSPECIFIED CKD STAGE (HCC): ICD-10-CM

## 2022-11-08 DIAGNOSIS — E11.9 TYPE 2 DIABETES MELLITUS WITHOUT COMPLICATION, WITH LONG-TERM CURRENT USE OF INSULIN (HCC): Primary | ICD-10-CM

## 2022-11-08 DIAGNOSIS — I10 PRIMARY HYPERTENSION: ICD-10-CM

## 2022-11-08 DIAGNOSIS — E11.65 TYPE 2 DIABETES MELLITUS WITH HYPERGLYCEMIA, WITH LONG-TERM CURRENT USE OF INSULIN (HCC): ICD-10-CM

## 2022-11-08 DIAGNOSIS — Z79.4 TYPE 2 DIABETES MELLITUS WITH HYPERGLYCEMIA, WITH LONG-TERM CURRENT USE OF INSULIN (HCC): ICD-10-CM

## 2022-11-08 DIAGNOSIS — Z79.4 TYPE 2 DIABETES MELLITUS WITH CHRONIC KIDNEY DISEASE, WITH LONG-TERM CURRENT USE OF INSULIN, UNSPECIFIED CKD STAGE (HCC): ICD-10-CM

## 2022-11-08 PROCEDURE — G8484 FLU IMMUNIZE NO ADMIN: HCPCS | Performed by: STUDENT IN AN ORGANIZED HEALTH CARE EDUCATION/TRAINING PROGRAM

## 2022-11-08 PROCEDURE — 3052F HG A1C>EQUAL 8.0%<EQUAL 9.0%: CPT | Performed by: STUDENT IN AN ORGANIZED HEALTH CARE EDUCATION/TRAINING PROGRAM

## 2022-11-08 PROCEDURE — G8427 DOCREV CUR MEDS BY ELIG CLIN: HCPCS | Performed by: STUDENT IN AN ORGANIZED HEALTH CARE EDUCATION/TRAINING PROGRAM

## 2022-11-08 PROCEDURE — 1090F PRES/ABSN URINE INCON ASSESS: CPT | Performed by: STUDENT IN AN ORGANIZED HEALTH CARE EDUCATION/TRAINING PROGRAM

## 2022-11-08 PROCEDURE — G8417 CALC BMI ABV UP PARAM F/U: HCPCS | Performed by: STUDENT IN AN ORGANIZED HEALTH CARE EDUCATION/TRAINING PROGRAM

## 2022-11-08 PROCEDURE — 3078F DIAST BP <80 MM HG: CPT | Performed by: STUDENT IN AN ORGANIZED HEALTH CARE EDUCATION/TRAINING PROGRAM

## 2022-11-08 PROCEDURE — G8399 PT W/DXA RESULTS DOCUMENT: HCPCS | Performed by: STUDENT IN AN ORGANIZED HEALTH CARE EDUCATION/TRAINING PROGRAM

## 2022-11-08 PROCEDURE — 99213 OFFICE O/P EST LOW 20 MIN: CPT | Performed by: STUDENT IN AN ORGANIZED HEALTH CARE EDUCATION/TRAINING PROGRAM

## 2022-11-08 PROCEDURE — 1123F ACP DISCUSS/DSCN MKR DOCD: CPT | Performed by: STUDENT IN AN ORGANIZED HEALTH CARE EDUCATION/TRAINING PROGRAM

## 2022-11-08 PROCEDURE — 1036F TOBACCO NON-USER: CPT | Performed by: STUDENT IN AN ORGANIZED HEALTH CARE EDUCATION/TRAINING PROGRAM

## 2022-11-08 PROCEDURE — 3074F SYST BP LT 130 MM HG: CPT | Performed by: STUDENT IN AN ORGANIZED HEALTH CARE EDUCATION/TRAINING PROGRAM

## 2022-11-08 ASSESSMENT — ENCOUNTER SYMPTOMS
COUGH: 0
SORE THROAT: 0
DIARRHEA: 0
BACK PAIN: 0
CHEST TIGHTNESS: 0
VOMITING: 0
ABDOMINAL PAIN: 0
CONSTIPATION: 0
NAUSEA: 0
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
## 2022-11-09 NOTE — ASSESSMENT & PLAN NOTE
Last A1c was uncontrolled  -Continue glipizide 10 mg in the morning and 30 mg in the evening, insulin detemir 42 units nightly and metformin 850 mg p.o. twice daily

## 2022-11-09 NOTE — ASSESSMENT & PLAN NOTE
Blood pressure controlled  -Continue home metoprolol 50 mg p.o. twice daily and losartan 50 mg p.o. daily

## 2022-12-06 ENCOUNTER — NURSE ONLY (OUTPATIENT)
Dept: INTERNAL MEDICINE CLINIC | Age: 77
End: 2022-12-06
Payer: MEDICARE

## 2022-12-06 DIAGNOSIS — Z79.4 TYPE 2 DIABETES MELLITUS WITHOUT COMPLICATION, WITH LONG-TERM CURRENT USE OF INSULIN (HCC): ICD-10-CM

## 2022-12-06 DIAGNOSIS — E11.9 TYPE 2 DIABETES MELLITUS WITHOUT COMPLICATION, WITH LONG-TERM CURRENT USE OF INSULIN (HCC): ICD-10-CM

## 2022-12-06 LAB
ALBUMIN SERPL-MCNC: 4.3 G/DL (ref 3.4–5)
ANION GAP SERPL CALCULATED.3IONS-SCNC: 17 MMOL/L (ref 3–16)
BUN BLDV-MCNC: 15 MG/DL (ref 7–20)
CALCIUM SERPL-MCNC: 9.9 MG/DL (ref 8.3–10.6)
CHLORIDE BLD-SCNC: 98 MMOL/L (ref 99–110)
CHOLESTEROL, TOTAL: 129 MG/DL (ref 0–199)
CO2: 24 MMOL/L (ref 21–32)
CREAT SERPL-MCNC: 1 MG/DL (ref 0.6–1.2)
ESTIMATED AVERAGE GLUCOSE: 180 MG/DL
GFR SERPL CREATININE-BSD FRML MDRD: 58 ML/MIN/{1.73_M2}
GLUCOSE BLD-MCNC: 129 MG/DL (ref 70–99)
HBA1C MFR BLD: 7.9 %
HDLC SERPL-MCNC: 30 MG/DL (ref 40–60)
LDL CHOLESTEROL CALCULATED: 64 MG/DL
PHOSPHORUS: 4.7 MG/DL (ref 2.5–4.9)
POTASSIUM SERPL-SCNC: 4.7 MMOL/L (ref 3.5–5.1)
SODIUM BLD-SCNC: 139 MMOL/L (ref 136–145)
TRIGL SERPL-MCNC: 176 MG/DL (ref 0–150)
VLDLC SERPL CALC-MCNC: 35 MG/DL

## 2022-12-06 PROCEDURE — 99999 PR OFFICE/OUTPT VISIT,PROCEDURE ONLY: CPT | Performed by: STUDENT IN AN ORGANIZED HEALTH CARE EDUCATION/TRAINING PROGRAM

## 2022-12-06 PROCEDURE — 36415 COLL VENOUS BLD VENIPUNCTURE: CPT | Performed by: STUDENT IN AN ORGANIZED HEALTH CARE EDUCATION/TRAINING PROGRAM

## 2022-12-07 ENCOUNTER — TELEPHONE (OUTPATIENT)
Dept: INTERNAL MEDICINE CLINIC | Age: 77
End: 2022-12-07

## 2022-12-12 ENCOUNTER — TELEPHONE (OUTPATIENT)
Dept: INTERNAL MEDICINE CLINIC | Age: 77
End: 2022-12-12
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
## 2022-12-13 NOTE — TELEPHONE ENCOUNTER
Printed out the labs and sent them away with [employer]. Should leave the office tomorrow.

## 2022-12-16 DIAGNOSIS — Z79.4 TYPE 2 DIABETES MELLITUS WITHOUT COMPLICATION, WITH LONG-TERM CURRENT USE OF INSULIN (HCC): ICD-10-CM

## 2022-12-16 DIAGNOSIS — E11.9 TYPE 2 DIABETES MELLITUS WITHOUT COMPLICATION, WITH LONG-TERM CURRENT USE OF INSULIN (HCC): ICD-10-CM

## 2022-12-16 RX ORDER — CALCIUM CITRATE/VITAMIN D3 200MG-6.25
TABLET ORAL
Qty: 100 STRIP | Refills: 3 | Status: SHIPPED | OUTPATIENT
Start: 2022-12-16

## 2022-12-16 NOTE — TELEPHONE ENCOUNTER
Medication:   Requested Prescriptions     Pending Prescriptions Disp Refills    TRUE METRIX BLOOD GLUCOSE TEST strip [Pharmacy Med Name: TRUE METRIX BLOOD GLUCOSE TEST STRP] 100 strip 3     Sig: TEST TWICE DAILY       Last Filled:      Patient Phone Number: [phone] (home) [phone] (work)    Last appt: 9/6/2022   Next appt: Visit date not found

## 2022-12-19 ENCOUNTER — TELEPHONE (OUTPATIENT)
Dept: INTERNAL MEDICINE CLINIC | Age: 77
End: 2022-12-19

## 2022-12-19 RX ORDER — INSULIN GLARGINE 100 [IU]/ML
42 INJECTION, SOLUTION SUBCUTANEOUS NIGHTLY
Qty: 15 ADJUSTABLE DOSE PRE-FILLED PEN SYRINGE | Refills: 11 | Status: SHIPPED | OUTPATIENT
Start: 2022-12-19

## 2022-12-19 NOTE — TELEPHONE ENCOUNTER
She needs her insulin pens , you were going to give  her a different one that was cheaper. Please send to her mail away.

## 2023-02-24 ENCOUNTER — TELEPHONE (OUTPATIENT)
Dept: INTERNAL MEDICINE CLINIC | Age: 78
End: 2023-02-24

## 2023-02-24 DIAGNOSIS — E11.65 UNCONTROLLED TYPE 2 DIABETES MELLITUS WITH HYPERGLYCEMIA (HCC): ICD-10-CM

## 2023-02-24 RX ORDER — GLIPIZIDE 10 MG/1
TABLET ORAL
Qty: 360 TABLET | Refills: 3 | Status: SHIPPED | OUTPATIENT
Start: 2023-02-24

## 2023-02-24 NOTE — TELEPHONE ENCOUNTER
Debbie called to request an Rx for  Glucatrol 10 mg, #360, 1 tab before breakfast and 3 tabs before supper.

## 2023-02-27 DIAGNOSIS — E11.65 UNCONTROLLED TYPE 2 DIABETES MELLITUS WITH HYPERGLYCEMIA (HCC): ICD-10-CM

## 2023-05-16 ENCOUNTER — OFFICE VISIT (OUTPATIENT)
Dept: INTERNAL MEDICINE CLINIC | Age: 78
End: 2023-05-16

## 2023-05-16 VITALS
TEMPERATURE: 97 F | BODY MASS INDEX: 31.07 KG/M2 | HEART RATE: 63 BPM | SYSTOLIC BLOOD PRESSURE: 138 MMHG | DIASTOLIC BLOOD PRESSURE: 76 MMHG | WEIGHT: 182 LBS | HEIGHT: 64 IN | OXYGEN SATURATION: 97 %

## 2023-05-16 DIAGNOSIS — E11.65 TYPE 2 DIABETES MELLITUS WITH HYPERGLYCEMIA, WITH LONG-TERM CURRENT USE OF INSULIN (HCC): ICD-10-CM

## 2023-05-16 DIAGNOSIS — Z79.4 TYPE 2 DIABETES MELLITUS WITHOUT COMPLICATION, WITH LONG-TERM CURRENT USE OF INSULIN (HCC): Primary | ICD-10-CM

## 2023-05-16 DIAGNOSIS — E11.9 TYPE 2 DIABETES MELLITUS WITHOUT COMPLICATION, WITH LONG-TERM CURRENT USE OF INSULIN (HCC): Primary | ICD-10-CM

## 2023-05-16 DIAGNOSIS — I10 ESSENTIAL HYPERTENSION: ICD-10-CM

## 2023-05-16 DIAGNOSIS — E78.00 PURE HYPERCHOLESTEROLEMIA: ICD-10-CM

## 2023-05-16 DIAGNOSIS — Z79.4 TYPE 2 DIABETES MELLITUS WITH HYPERGLYCEMIA, WITH LONG-TERM CURRENT USE OF INSULIN (HCC): ICD-10-CM

## 2023-05-16 PROBLEM — C55 MALIGNANT NEOPLASM OF UTERUS (HCC): Status: RESOLVED | Noted: 2021-03-31 | Resolved: 2023-05-16

## 2023-05-16 RX ORDER — ATORVASTATIN CALCIUM 10 MG/1
TABLET, FILM COATED ORAL
Qty: 90 TABLET | Refills: 3 | Status: SHIPPED | OUTPATIENT
Start: 2023-05-16

## 2023-05-16 RX ORDER — INSULIN GLARGINE 100 [IU]/ML
40 INJECTION, SOLUTION SUBCUTANEOUS NIGHTLY
Qty: 15 ADJUSTABLE DOSE PRE-FILLED PEN SYRINGE | Refills: 3 | Status: SHIPPED | OUTPATIENT
Start: 2023-05-16

## 2023-05-16 RX ORDER — METOPROLOL TARTRATE 50 MG/1
TABLET, FILM COATED ORAL
Qty: 180 TABLET | Refills: 3 | Status: SHIPPED | OUTPATIENT
Start: 2023-05-16

## 2023-05-16 RX ORDER — LOSARTAN POTASSIUM 50 MG/1
TABLET ORAL
Qty: 180 TABLET | Refills: 3 | Status: SHIPPED | OUTPATIENT
Start: 2023-05-16

## 2023-05-16 SDOH — ECONOMIC STABILITY: HOUSING INSECURITY
IN THE LAST 12 MONTHS, WAS THERE A TIME WHEN YOU DID NOT HAVE A STEADY PLACE TO SLEEP OR SLEPT IN A SHELTER (INCLUDING NOW)?: NO

## 2023-05-16 SDOH — ECONOMIC STABILITY: FOOD INSECURITY: WITHIN THE PAST 12 MONTHS, YOU WORRIED THAT YOUR FOOD WOULD RUN OUT BEFORE YOU GOT MONEY TO BUY MORE.: NEVER TRUE

## 2023-05-16 SDOH — ECONOMIC STABILITY: INCOME INSECURITY: HOW HARD IS IT FOR YOU TO PAY FOR THE VERY BASICS LIKE FOOD, HOUSING, MEDICAL CARE, AND HEATING?: NOT HARD AT ALL

## 2023-05-16 SDOH — ECONOMIC STABILITY: FOOD INSECURITY: WITHIN THE PAST 12 MONTHS, THE FOOD YOU BOUGHT JUST DIDN'T LAST AND YOU DIDN'T HAVE MONEY TO GET MORE.: NEVER TRUE

## 2023-05-16 ASSESSMENT — ENCOUNTER SYMPTOMS
COUGH: 0
NAUSEA: 0
ABDOMINAL PAIN: 0
CHEST TIGHTNESS: 0
SORE THROAT: 0
DIARRHEA: 0
BACK PAIN: 0
CONSTIPATION: 0
VOMITING: 0

## 2023-05-16 ASSESSMENT — PATIENT HEALTH QUESTIONNAIRE - PHQ9
SUM OF ALL RESPONSES TO PHQ QUESTIONS 1-9: 0
SUM OF ALL RESPONSES TO PHQ9 QUESTIONS 1 & 2: 0
SUM OF ALL RESPONSES TO PHQ QUESTIONS 1-9: 0
1. LITTLE INTEREST OR PLEASURE IN DOING THINGS: 0
2. FEELING DOWN, DEPRESSED OR HOPELESS: 0
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
## 2023-05-16 NOTE — PATIENT INSTRUCTIONS
Keep up the good work. Write down sugars from every morning for the next 2 weeks.  Ill call you 30th and 31st.

## 2023-05-16 NOTE — ASSESSMENT & PLAN NOTE
Poc A1c was 7.9. Eleazar patient feels like her sugar has been low.  -Advised to continue to monitor sugar at least daily. Encouraged to record the value.   I will call in 2 weeks to evaluate blood sugar control

## 2023-05-16 NOTE — ASSESSMENT & PLAN NOTE
Blood pressure is controlled.   Continue current therapy with losartan 50 mg p.o. twice daily and metoprolol 50 mg p.o. twice daily

## 2023-05-30 ENCOUNTER — OFFICE VISIT (OUTPATIENT)
Dept: INTERNAL MEDICINE CLINIC | Age: 78
End: 2023-05-30
Payer: MEDICARE

## 2023-05-30 DIAGNOSIS — Z00.00 INITIAL MEDICARE ANNUAL WELLNESS VISIT: Primary | ICD-10-CM

## 2023-05-30 DIAGNOSIS — E11.65 UNCONTROLLED TYPE 2 DIABETES MELLITUS WITH HYPERGLYCEMIA (HCC): ICD-10-CM

## 2023-05-30 PROCEDURE — G0438 PPPS, INITIAL VISIT: HCPCS | Performed by: STUDENT IN AN ORGANIZED HEALTH CARE EDUCATION/TRAINING PROGRAM

## 2023-05-30 PROCEDURE — 1123F ACP DISCUSS/DSCN MKR DOCD: CPT | Performed by: STUDENT IN AN ORGANIZED HEALTH CARE EDUCATION/TRAINING PROGRAM

## 2023-05-30 RX ORDER — GLIPIZIDE 10 MG/1
TABLET ORAL
Qty: 360 TABLET | Refills: 3
Start: 2023-05-30

## 2023-05-30 ASSESSMENT — PATIENT HEALTH QUESTIONNAIRE - PHQ9
SUM OF ALL RESPONSES TO PHQ9 QUESTIONS 1 & 2: 0
1. LITTLE INTEREST OR PLEASURE IN DOING THINGS: 0
2. FEELING DOWN, DEPRESSED OR HOPELESS: 0
SUM OF ALL RESPONSES TO PHQ QUESTIONS 1-9: 0

## 2023-05-30 ASSESSMENT — LIFESTYLE VARIABLES
HOW MANY STANDARD DRINKS CONTAINING ALCOHOL DO YOU HAVE ON A TYPICAL DAY: PATIENT DOES NOT DRINK
HOW OFTEN DO YOU HAVE A DRINK CONTAINING ALCOHOL: NEVER

## 2023-05-30 NOTE — PATIENT INSTRUCTIONS

## 2023-05-30 NOTE — PROGRESS NOTES
[facility]) 100 UNIT/ML injection pen Inject 40 Units into the skin nightly Yes Estefanía Bender MD   losartan (COZAAR) 50 MG tablet TAKE 1 TABLET TWICE A DAY  FOR BLOOD PRESSURE (CHANGE IN DIRECTIONS) Yes Estefanía Bender MD   atorvastatin (LIPITOR) 10 MG tablet TAKE 1 TABLET DAILY Yes Estefanía Bender MD   metoprolol tartrate (LOPRESSOR) 50 MG tablet TAKE 1 TABLET TWICE A DAY Yes Estefanía Bender MD   metFORMIN (GLUCOPHAGE) 850 MG tablet TAKE 1 TABLET TWICE DAILY  WITH MEALS Yes Estefanía Bender MD   glipiZIDE (GLUCOTROL) 10 MG tablet TAKE 1 TABLET BEFORE       BREAKFAST AND 3 TABLETS    BEFORE SUPPER Yes Estefanía Bender MD   TRUE METRIX BLOOD GLUCOSE TEST strip TEST TWICE DAILY Yes Estefanía Bender MD   INSULIN SYRINGE .5CC/29G (AIMSCO INSULIN SYR ULTRA THIN) 29G X 1/2\" 0.5 ML MISC 1 each by Does not apply route daily Yes Haroon Lees MD   BD PEN NEEDLE CIELO U/F 32G X 4 MM MISC USE AND DISCARD 1 PEN      NEEDLE DAILY Yes Haroon Lees MD   aspirin 81 MG EC tablet Aspirin 81 MG Delayed Release Oral Tablet  orally 81.0 1     Active Yes Historical Provider, MD   aspirin 81 MG EC tablet Take 1 tablet by mouth daily Yes Haroon Lees MD   loratadine (CLARITIN) 10 MG capsule 1 po q day Yes Haroon Lees MD   Blood Glucose Monitoring Suppl BHANU Disp 1  Also lancets and strips for bid testing 100 RF x3 Yes Haroon Lees MD   Lancets MISC 1 each by Does not apply route daily Yes Haroon Lees MD   Blood Glucose Monitoring Suppl BHANU Disp 1 for diabetes Yes Haroon Lees MD   ACCU-CHEK SOFTCLIX LANCETS MISC Twice daily sugars check Yes Haroon Lees MD       [facility] (Including outside providers/suppliers regularly involved in providing care):   Patient Care Team:  Estefanía Bender MD as PCP - General (Internal Medicine)  Estefanía Bender MD as PCP - Empaneled Provider  Sisi Navarro MD (Urology)  Edouard Bhardwaj MD as Consulting Physician (Gynecological Oncology)  Efra Donald

## 2023-06-15 ENCOUNTER — TELEPHONE (OUTPATIENT)
Dept: INTERNAL MEDICINE CLINIC | Age: 78
End: 2023-06-15

## 2023-08-16 ENCOUNTER — OFFICE VISIT (OUTPATIENT)
Dept: INTERNAL MEDICINE CLINIC | Age: 78
End: 2023-08-16
Payer: MEDICARE

## 2023-08-16 VITALS
TEMPERATURE: 98.3 F | HEART RATE: 79 BPM | SYSTOLIC BLOOD PRESSURE: 137 MMHG | DIASTOLIC BLOOD PRESSURE: 88 MMHG | BODY MASS INDEX: 31.62 KG/M2 | WEIGHT: 184.2 LBS | OXYGEN SATURATION: 93 %

## 2023-08-16 DIAGNOSIS — N18.31 TYPE 2 DIABETES MELLITUS WITH STAGE 3A CHRONIC KIDNEY DISEASE, WITH LONG-TERM CURRENT USE OF INSULIN (HCC): ICD-10-CM

## 2023-08-16 DIAGNOSIS — Z79.4 TYPE 2 DIABETES MELLITUS WITH STAGE 3A CHRONIC KIDNEY DISEASE, WITH LONG-TERM CURRENT USE OF INSULIN (HCC): ICD-10-CM

## 2023-08-16 DIAGNOSIS — I10 ESSENTIAL HYPERTENSION: Primary | ICD-10-CM

## 2023-08-16 DIAGNOSIS — E11.22 TYPE 2 DIABETES MELLITUS WITH STAGE 3A CHRONIC KIDNEY DISEASE, WITH LONG-TERM CURRENT USE OF INSULIN (HCC): ICD-10-CM

## 2023-08-16 LAB — HBA1C MFR BLD: 7.7 %

## 2023-08-16 PROCEDURE — G8427 DOCREV CUR MEDS BY ELIG CLIN: HCPCS | Performed by: STUDENT IN AN ORGANIZED HEALTH CARE EDUCATION/TRAINING PROGRAM

## 2023-08-16 PROCEDURE — 1090F PRES/ABSN URINE INCON ASSESS: CPT | Performed by: STUDENT IN AN ORGANIZED HEALTH CARE EDUCATION/TRAINING PROGRAM

## 2023-08-16 PROCEDURE — 83036 HEMOGLOBIN GLYCOSYLATED A1C: CPT | Performed by: STUDENT IN AN ORGANIZED HEALTH CARE EDUCATION/TRAINING PROGRAM

## 2023-08-16 PROCEDURE — G8399 PT W/DXA RESULTS DOCUMENT: HCPCS | Performed by: STUDENT IN AN ORGANIZED HEALTH CARE EDUCATION/TRAINING PROGRAM

## 2023-08-16 PROCEDURE — 99213 OFFICE O/P EST LOW 20 MIN: CPT | Performed by: STUDENT IN AN ORGANIZED HEALTH CARE EDUCATION/TRAINING PROGRAM

## 2023-08-16 PROCEDURE — 3074F SYST BP LT 130 MM HG: CPT | Performed by: STUDENT IN AN ORGANIZED HEALTH CARE EDUCATION/TRAINING PROGRAM

## 2023-08-16 PROCEDURE — 3051F HG A1C>EQUAL 7.0%<8.0%: CPT | Performed by: STUDENT IN AN ORGANIZED HEALTH CARE EDUCATION/TRAINING PROGRAM

## 2023-08-16 PROCEDURE — 3078F DIAST BP <80 MM HG: CPT | Performed by: STUDENT IN AN ORGANIZED HEALTH CARE EDUCATION/TRAINING PROGRAM

## 2023-08-16 PROCEDURE — 1123F ACP DISCUSS/DSCN MKR DOCD: CPT | Performed by: STUDENT IN AN ORGANIZED HEALTH CARE EDUCATION/TRAINING PROGRAM

## 2023-08-16 PROCEDURE — 1036F TOBACCO NON-USER: CPT | Performed by: STUDENT IN AN ORGANIZED HEALTH CARE EDUCATION/TRAINING PROGRAM

## 2023-08-16 PROCEDURE — G8417 CALC BMI ABV UP PARAM F/U: HCPCS | Performed by: STUDENT IN AN ORGANIZED HEALTH CARE EDUCATION/TRAINING PROGRAM

## 2023-08-16 ASSESSMENT — ENCOUNTER SYMPTOMS
DIARRHEA: 0
COUGH: 0
CHEST TIGHTNESS: 0
BACK PAIN: 0
SORE THROAT: 0
VOMITING: 0
NAUSEA: 0
ABDOMINAL PAIN: 0
CONSTIPATION: 0

## 2023-08-16 NOTE — PROGRESS NOTES
without mention of complication, not stated as uncontrolled     Vertigo 2019       Past Surgical History:   Procedure Laterality Date    COLONOSCOPY      FINGER TRIGGER RELEASE      thumb right hand    HYSTERECTOMY (CERVIX STATUS UNKNOWN)      SKIN GRAFT         Social History     Socioeconomic History    Marital status:      Spouse name: Not on file    Number of children: Not on file    Years of education: Not on file    Highest education level: Not on file   Occupational History    Occupation: Retired   Tobacco Use    Smoking status: Former     Packs/day: 1.00     Years: 20.00     Additional pack years: 0.00     Total pack years: 20.00     Types: Cigarettes     Start date: 1965     Quit date: 1985     Years since quittin.5    Smokeless tobacco: Never   Substance and Sexual Activity    Alcohol use: No    Drug use: No    Sexual activity: Not on file   Other Topics Concern    Not on file   Social History Narrative    Not on file     Social Determinants of Health     Financial Resource Strain: Low Risk  (2023)    Overall Financial Resource Strain (CARDIA)     Difficulty of Paying Living Expenses: Not hard at all   Food Insecurity: No Food Insecurity (2023)    Hunger Vital Sign     Worried About Running Out of Food in the Last Year: Never true     [address] in the Last Year: Never true   Transportation Needs: Unknown (2023)    PRAPARE - Transportation     Lack of Transportation (Medical): Not on file     Lack of Transportation (Non-Medical):  No   Physical Activity: Insufficiently Active (2023)    Exercise Vital Sign     Days of Exercise per Week: 3 days     Minutes of Exercise per Session: 30 min   Stress: Not on file   Social Connections: Not on file   Intimate Partner Violence: Not on file   Housing Stability: Unknown (2023)    Housing Stability Vital Sign     Unable to Pay for Housing in the Last Year: Not on file     Number of [address] in the Last Year:

## 2023-09-05 ENCOUNTER — HOSPITAL ENCOUNTER (OUTPATIENT)
Dept: WOMENS IMAGING | Age: 78
Discharge: HOME | End: 2023-09-05
Payer: MEDICARE

## 2023-09-05 VITALS — WEIGHT: 182 LBS | HEIGHT: 64 IN | BODY MASS INDEX: 31.07 KG/M2

## 2023-09-05 DIAGNOSIS — Z13.9 ENCOUNTER FOR SCREENING: ICD-10-CM

## 2023-09-05 PROCEDURE — 77063 BREAST TOMOSYNTHESIS BI: CPT

## 2023-09-20 DIAGNOSIS — E11.65 UNCONTROLLED TYPE 2 DIABETES MELLITUS WITH HYPERGLYCEMIA (HCC): ICD-10-CM

## 2023-09-20 NOTE — TELEPHONE ENCOUNTER
Pt needs refill for BD PEN NEEDLE CIELO U/F 32G X 4 MM MISC    Please send to [facility]    Thank you

## 2023-09-21 RX ORDER — PEN NEEDLE, DIABETIC 32GX 5/32"
NEEDLE, DISPOSABLE MISCELLANEOUS
Qty: 100 EACH | Refills: 3 | Status: SHIPPED | OUTPATIENT
Start: 2023-09-21

## 2023-11-21 ENCOUNTER — OFFICE VISIT (OUTPATIENT)
Dept: INTERNAL MEDICINE CLINIC | Age: 78
End: 2023-11-21

## 2023-11-21 VITALS
SYSTOLIC BLOOD PRESSURE: 140 MMHG | BODY MASS INDEX: 31.24 KG/M2 | WEIGHT: 183 LBS | OXYGEN SATURATION: 98 % | HEART RATE: 72 BPM | HEIGHT: 64 IN | DIASTOLIC BLOOD PRESSURE: 80 MMHG

## 2023-11-21 DIAGNOSIS — R06.09 DOE (DYSPNEA ON EXERTION): ICD-10-CM

## 2023-11-21 DIAGNOSIS — Z79.4 TYPE 2 DIABETES MELLITUS WITH MICROALBUMINURIA, WITH LONG-TERM CURRENT USE OF INSULIN (HCC): ICD-10-CM

## 2023-11-21 DIAGNOSIS — R80.9 TYPE 2 DIABETES MELLITUS WITH MICROALBUMINURIA, WITH LONG-TERM CURRENT USE OF INSULIN (HCC): ICD-10-CM

## 2023-11-21 DIAGNOSIS — R53.83 OTHER FATIGUE: ICD-10-CM

## 2023-11-21 DIAGNOSIS — I10 ESSENTIAL HYPERTENSION: Primary | ICD-10-CM

## 2023-11-21 DIAGNOSIS — E78.00 PURE HYPERCHOLESTEROLEMIA: ICD-10-CM

## 2023-11-21 DIAGNOSIS — E11.29 TYPE 2 DIABETES MELLITUS WITH MICROALBUMINURIA, WITH LONG-TERM CURRENT USE OF INSULIN (HCC): ICD-10-CM

## 2023-11-21 LAB
ALBUMIN SERPL-MCNC: 4.4 G/DL (ref 3.4–5)
ALBUMIN/GLOB SERPL: 1.5 {RATIO} (ref 1.1–2.2)
ALP SERPL-CCNC: 65 U/L (ref 40–129)
ALT SERPL-CCNC: 19 U/L (ref 10–40)
ANION GAP SERPL CALCULATED.3IONS-SCNC: 17 MMOL/L (ref 3–16)
AST SERPL-CCNC: 23 U/L (ref 15–37)
BILIRUB SERPL-MCNC: 0.4 MG/DL (ref 0–1)
BUN SERPL-MCNC: 20 MG/DL (ref 7–20)
CALCIUM SERPL-MCNC: 9.5 MG/DL (ref 8.3–10.6)
CHLORIDE SERPL-SCNC: 102 MMOL/L (ref 99–110)
CHOLEST SERPL-MCNC: 145 MG/DL (ref 0–199)
CO2 SERPL-SCNC: 20 MMOL/L (ref 21–32)
CREAT SERPL-MCNC: 1.1 MG/DL (ref 0.6–1.2)
EST. AVERAGE GLUCOSE BLD GHB EST-MCNC: 182.9 MG/DL
GFR SERPLBLD CREATININE-BSD FMLA CKD-EPI: 51 ML/MIN/{1.73_M2}
GLUCOSE SERPL-MCNC: 201 MG/DL (ref 70–99)
HBA1C MFR BLD: 8 %
HDLC SERPL-MCNC: 32 MG/DL (ref 40–60)
LDLC SERPL CALC-MCNC: 67 MG/DL
POTASSIUM SERPL-SCNC: 4.9 MMOL/L (ref 3.5–5.1)
PROT SERPL-MCNC: 7.4 G/DL (ref 6.4–8.2)
SODIUM SERPL-SCNC: 139 MMOL/L (ref 136–145)
TRIGL SERPL-MCNC: 229 MG/DL (ref 0–150)
TSH SERPL DL<=0.005 MIU/L-ACNC: 2.11 UIU/ML (ref 0.27–4.2)
VLDLC SERPL CALC-MCNC: 46 MG/DL

## 2023-11-21 ASSESSMENT — ENCOUNTER SYMPTOMS
NAUSEA: 0
BACK PAIN: 0
VOMITING: 0
CHEST TIGHTNESS: 0
COUGH: 0
ABDOMINAL PAIN: 0
CONSTIPATION: 0
DIARRHEA: 0
SORE THROAT: 0
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
## 2023-12-04 ENCOUNTER — TELEPHONE (OUTPATIENT)
Dept: INTERNAL MEDICINE CLINIC | Age: 78
End: 2023-12-04

## 2023-12-27 ENCOUNTER — HOSPITAL ENCOUNTER (OUTPATIENT)
Dept: CARDIOLOGY | Age: 78
Discharge: HOME | End: 2023-12-27
Attending: STUDENT IN AN ORGANIZED HEALTH CARE EDUCATION/TRAINING PROGRAM
Payer: MEDICARE

## 2023-12-27 DIAGNOSIS — R06.09 DOE (DYSPNEA ON EXERTION): ICD-10-CM

## 2023-12-27 DIAGNOSIS — R53.83 OTHER FATIGUE: ICD-10-CM

## 2023-12-27 PROCEDURE — 93306 TTE W/DOPPLER COMPLETE: CPT

## 2024-01-02 ENCOUNTER — TELEPHONE (OUTPATIENT)
Dept: INTERNAL MEDICINE CLINIC | Age: 79
End: 2024-01-02

## 2024-01-02 NOTE — TELEPHONE ENCOUNTER
----- Message from Rusty Alvarez MD sent at 1/2/2024  9:27 AM EST -----  Echocardiogram was normal showing normal heart function, normal heart valve function, and normal size. No size of heart disease or heart failure

## 2024-01-16 DIAGNOSIS — E11.65 UNCONTROLLED TYPE 2 DIABETES MELLITUS WITH HYPERGLYCEMIA (HCC): ICD-10-CM

## 2024-01-16 NOTE — TELEPHONE ENCOUNTER
Last office visit 11/21/2023       Next office visit scheduled 5/21/2024    Requested Prescriptions     Pending Prescriptions Disp Refills    metFORMIN (GLUCOPHAGE) 850 MG tablet [Pharmacy Med Name: METFORMIN TAB 850MG] 180 tablet 1     Sig: TAKE 1 TABLET TWICE DAILY  WITH MEALS

## 2024-04-01 ENCOUNTER — HOSPITAL ENCOUNTER (EMERGENCY)
Age: 79
Discharge: HOME | End: 2024-04-01
Attending: EMERGENCY MEDICINE
Payer: MEDICARE

## 2024-04-01 VITALS
HEART RATE: 90 BPM | SYSTOLIC BLOOD PRESSURE: 158 MMHG | HEIGHT: 64 IN | OXYGEN SATURATION: 95 % | WEIGHT: 185.19 LBS | BODY MASS INDEX: 31.62 KG/M2 | RESPIRATION RATE: 16 BRPM | DIASTOLIC BLOOD PRESSURE: 85 MMHG | TEMPERATURE: 99.1 F

## 2024-04-01 DIAGNOSIS — S16.1XXA STRAIN OF NECK MUSCLE, INITIAL ENCOUNTER: Primary | ICD-10-CM

## 2024-04-01 PROCEDURE — 99284 EMERGENCY DEPT VISIT MOD MDM: CPT

## 2024-04-01 PROCEDURE — 96372 THER/PROPH/DIAG INJ SC/IM: CPT

## 2024-04-01 PROCEDURE — 6360000002 HC RX W HCPCS: Performed by: EMERGENCY MEDICINE

## 2024-04-01 PROCEDURE — 6370000000 HC RX 637 (ALT 250 FOR IP): Performed by: EMERGENCY MEDICINE

## 2024-04-01 RX ORDER — METHOCARBAMOL 500 MG/1
500 TABLET, FILM COATED ORAL ONCE
Status: COMPLETED | OUTPATIENT
Start: 2024-04-01 | End: 2024-04-01

## 2024-04-01 RX ORDER — KETOROLAC TROMETHAMINE 15 MG/ML
15 INJECTION, SOLUTION INTRAMUSCULAR; INTRAVENOUS ONCE
Status: COMPLETED | OUTPATIENT
Start: 2024-04-01 | End: 2024-04-01

## 2024-04-01 RX ORDER — KETOROLAC TROMETHAMINE 15 MG/ML
15 INJECTION, SOLUTION INTRAMUSCULAR; INTRAVENOUS ONCE
Status: DISCONTINUED | OUTPATIENT
Start: 2024-04-01 | End: 2024-04-01

## 2024-04-01 RX ORDER — TIZANIDINE 2 MG/1
2 TABLET ORAL EVERY 8 HOURS PRN
Qty: 15 TABLET | Refills: 0 | Status: SHIPPED | OUTPATIENT
Start: 2024-04-01

## 2024-04-01 RX ORDER — IBUPROFEN 400 MG/1
400 TABLET ORAL EVERY 8 HOURS PRN
Qty: 15 TABLET | Refills: 0 | Status: SHIPPED | OUTPATIENT
Start: 2024-04-01

## 2024-04-01 RX ADMIN — METHOCARBAMOL 500 MG: 500 TABLET ORAL at 17:50

## 2024-04-01 RX ADMIN — KETOROLAC TROMETHAMINE 15 MG: 15 INJECTION, SOLUTION INTRAMUSCULAR; INTRAVENOUS at 17:50

## 2024-04-01 ASSESSMENT — PAIN - FUNCTIONAL ASSESSMENT
PAIN_FUNCTIONAL_ASSESSMENT: 0-10
PAIN_FUNCTIONAL_ASSESSMENT: 0-10

## 2024-04-01 ASSESSMENT — PAIN SCALES - GENERAL
PAINLEVEL_OUTOF10: 2
PAINLEVEL_OUTOF10: 0
PAINLEVEL_OUTOF10: 2

## 2024-04-01 ASSESSMENT — LIFESTYLE VARIABLES
HOW OFTEN DO YOU HAVE A DRINK CONTAINING ALCOHOL: NEVER
HOW MANY STANDARD DRINKS CONTAINING ALCOHOL DO YOU HAVE ON A TYPICAL DAY: PATIENT DOES NOT DRINK

## 2024-04-01 ASSESSMENT — PAIN DESCRIPTION - LOCATION: LOCATION: NECK

## 2024-04-01 ASSESSMENT — PAIN DESCRIPTION - DESCRIPTORS: DESCRIPTORS: ACHING

## 2024-04-01 ASSESSMENT — PAIN DESCRIPTION - ORIENTATION: ORIENTATION: LEFT;RIGHT

## 2024-04-01 NOTE — ED PROVIDER NOTES
erythema.  Neck: Supple, some bilateral lower cervical paraspinous trapezius tenderness.  Heart: Regular rate and rhythm.  No murmurs gallops noted.  Lungs: Breath sounds equal bilaterally and clear.  Musculoskeletal: Posterior lower cervical paraspinous trapezius tenderness as above.  No thoracic or lumbar tenderness.  Full range of motion the upper and lower extremities.  Normal gait.  Neuro: Symmetrical motor function upper extremities.  1+ symmetrical biceps, triceps, and brachioradialis reflexes.      DIFFERENTIAL DIAGNOSIS   Differential includes but is not limited to cervical strain/torticollis, osteoarthritis, cervical radiculopathy, other.      DIAGNOSTIC RESULTS     EKG: All EKG's are interpreted by ANTHONY SUNSHINE MD in the absence of a cardiologist.        RADIOLOGY:   Non-plain film images such as CT, Ultrasound and MRI are read by the radiologist. Plain radiographic images are visualized and preliminarily interpreted byANTHONY SUNSHINE MD with the below findings:        Interpretation per the Radiologist below, if available at the time of this note:    No orders to display         ED BEDSIDE ULTRASOUND:   Performed by ED Physician - none    LABS:  Labs Reviewed - No data to display    All other labs were within normal range or not returned as of this dictation.    EMERGENCY DEPARTMENT COURSE and DIFFERENTIAL DIAGNOSIS/MDM:   Vitals:    Vitals:    04/01/24 1739   BP: (!) 182/90   Pulse: 99   Resp: 18   Temp: 99.1 °F (37.3 °C)   TempSrc: Tympanic   SpO2: 96%   Weight: 84 kg (185 lb 3 oz)   Height: 1.626 m (5' 4\")       Patient was given the following medications:  Medications   ketorolac (TORADOL) injection 15 mg (has no administration in time range)   methocarbamol (ROBAXIN) tablet 500 mg (has no administration in time range)             Is this patient to be included in the SEP-1 Core Measure due to severe sepsis or septic shock?   No   Exclusion criteria - the patient is NOT to be

## 2024-04-01 NOTE — DISCHARGE INSTRUCTIONS
Use ice packs or cold compresses every 3-4 hours as needed for pain.    Take ibuprofen and tizanidine 3 times daily as prescribed for pain.    Follow-up in 5 to 7 days with your primary care provider if pain does not resolve

## 2024-04-03 ENCOUNTER — COMMUNITY CARE MANAGEMENT (OUTPATIENT)
Facility: CLINIC | Age: 79
End: 2024-04-03

## 2024-05-07 DIAGNOSIS — E78.00 PURE HYPERCHOLESTEROLEMIA: ICD-10-CM

## 2024-05-07 DIAGNOSIS — I10 ESSENTIAL HYPERTENSION: ICD-10-CM

## 2024-05-08 RX ORDER — METOPROLOL TARTRATE 50 MG/1
TABLET, FILM COATED ORAL
Qty: 180 TABLET | Refills: 3 | Status: SHIPPED | OUTPATIENT
Start: 2024-05-08

## 2024-05-08 RX ORDER — LOSARTAN POTASSIUM 50 MG/1
TABLET ORAL
Qty: 180 TABLET | Refills: 3 | Status: SHIPPED | OUTPATIENT
Start: 2024-05-08

## 2024-05-08 RX ORDER — ATORVASTATIN CALCIUM 10 MG/1
TABLET, FILM COATED ORAL
Qty: 90 TABLET | Refills: 3 | Status: SHIPPED | OUTPATIENT
Start: 2024-05-08

## 2024-05-08 NOTE — TELEPHONE ENCOUNTER
Future Appointments   Date Time Provider Department Center   5/21/2024 10:15 AM Rusty Alvarez MD [facility] [place] - [facility]       Last appt 11/21/23

## 2024-05-21 ENCOUNTER — OFFICE VISIT (OUTPATIENT)
Dept: INTERNAL MEDICINE CLINIC | Age: 79
End: 2024-05-21

## 2024-05-21 VITALS
TEMPERATURE: 97.8 F | OXYGEN SATURATION: 92 % | BODY MASS INDEX: 31.07 KG/M2 | WEIGHT: 181 LBS | HEART RATE: 64 BPM | SYSTOLIC BLOOD PRESSURE: 124 MMHG | DIASTOLIC BLOOD PRESSURE: 68 MMHG

## 2024-05-21 DIAGNOSIS — R80.9 TYPE 2 DIABETES MELLITUS WITH MICROALBUMINURIA, WITH LONG-TERM CURRENT USE OF INSULIN (HCC): Primary | ICD-10-CM

## 2024-05-21 DIAGNOSIS — Z79.4 TYPE 2 DIABETES MELLITUS WITH MICROALBUMINURIA, WITH LONG-TERM CURRENT USE OF INSULIN (HCC): Primary | ICD-10-CM

## 2024-05-21 DIAGNOSIS — E11.29 TYPE 2 DIABETES MELLITUS WITH MICROALBUMINURIA, WITH LONG-TERM CURRENT USE OF INSULIN (HCC): Primary | ICD-10-CM

## 2024-05-21 DIAGNOSIS — I10 ESSENTIAL HYPERTENSION: ICD-10-CM

## 2024-05-21 DIAGNOSIS — R53.83 OTHER FATIGUE: ICD-10-CM

## 2024-05-21 LAB — HBA1C MFR BLD: 8 %

## 2024-05-21 SDOH — ECONOMIC STABILITY: FOOD INSECURITY: WITHIN THE PAST 12 MONTHS, THE FOOD YOU BOUGHT JUST DIDN'T LAST AND YOU DIDN'T HAVE MONEY TO GET MORE.: NEVER TRUE

## 2024-05-21 SDOH — ECONOMIC STABILITY: INCOME INSECURITY: HOW HARD IS IT FOR YOU TO PAY FOR THE VERY BASICS LIKE FOOD, HOUSING, MEDICAL CARE, AND HEATING?: NOT HARD AT ALL

## 2024-05-21 SDOH — ECONOMIC STABILITY: FOOD INSECURITY: WITHIN THE PAST 12 MONTHS, YOU WORRIED THAT YOUR FOOD WOULD RUN OUT BEFORE YOU GOT MONEY TO BUY MORE.: NEVER TRUE

## 2024-05-21 ASSESSMENT — PATIENT HEALTH QUESTIONNAIRE - PHQ9
SUM OF ALL RESPONSES TO PHQ QUESTIONS 1-9: 0
SUM OF ALL RESPONSES TO PHQ QUESTIONS 1-9: 0
1. LITTLE INTEREST OR PLEASURE IN DOING THINGS: NOT AT ALL
SUM OF ALL RESPONSES TO PHQ QUESTIONS 1-9: 0
SUM OF ALL RESPONSES TO PHQ QUESTIONS 1-9: 0
SUM OF ALL RESPONSES TO PHQ9 QUESTIONS 1 & 2: 0
2. FEELING DOWN, DEPRESSED OR HOPELESS: NOT AT ALL
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
## 2024-05-31 DIAGNOSIS — Z79.4 TYPE 2 DIABETES MELLITUS WITH HYPERGLYCEMIA, WITH LONG-TERM CURRENT USE OF INSULIN (HCC): ICD-10-CM

## 2024-05-31 DIAGNOSIS — E11.65 TYPE 2 DIABETES MELLITUS WITH HYPERGLYCEMIA, WITH LONG-TERM CURRENT USE OF INSULIN (HCC): ICD-10-CM

## 2024-05-31 RX ORDER — INSULIN GLARGINE 100 [IU]/ML
INJECTION, SOLUTION SUBCUTANEOUS
Qty: 45 ML | Refills: 3 | Status: SHIPPED | OUTPATIENT
Start: 2024-05-31

## 2024-06-05 ENCOUNTER — OFFICE VISIT (OUTPATIENT)
Dept: INTERNAL MEDICINE CLINIC | Age: 79
End: 2024-06-05
Payer: MEDICARE

## 2024-06-05 DIAGNOSIS — Z00.00 MEDICARE ANNUAL WELLNESS VISIT, SUBSEQUENT: Primary | ICD-10-CM

## 2024-06-05 PROCEDURE — G0439 PPPS, SUBSEQ VISIT: HCPCS | Performed by: STUDENT IN AN ORGANIZED HEALTH CARE EDUCATION/TRAINING PROGRAM

## 2024-06-05 PROCEDURE — 1123F ACP DISCUSS/DSCN MKR DOCD: CPT | Performed by: STUDENT IN AN ORGANIZED HEALTH CARE EDUCATION/TRAINING PROGRAM

## 2024-06-05 ASSESSMENT — PATIENT HEALTH QUESTIONNAIRE - PHQ9
1. LITTLE INTEREST OR PLEASURE IN DOING THINGS: NOT AT ALL
SUM OF ALL RESPONSES TO PHQ QUESTIONS 1-9: 0
SUM OF ALL RESPONSES TO PHQ9 QUESTIONS 1 & 2: 0
2. FEELING DOWN, DEPRESSED OR HOPELESS: NOT AT ALL

## 2024-06-05 NOTE — PROGRESS NOTES
MG tablet Take 1 tablet by mouth 2 times daily Yes Rusty Alvarez MD   atorvastatin (LIPITOR) 10 MG tablet TAKE 1 TABLET DAILY Yes Rusty Alvarez MD   losartan (COZAAR) 50 MG tablet TAKE 1 TABLET TWICE A DAY  FOR BLOOD PRESSURE (CHANGE IN DIRECTIONS) Yes Rusty Alvarez MD   tiZANidine (ZANAFLEX) 2 MG tablet Take 1 tablet by mouth every 8 hours as needed (pain) Yes Delmer Bender MD   ibuprofen (ADVIL;MOTRIN) 400 MG tablet Take 1 tablet by mouth every 8 hours as needed for Pain Yes Delmer Bender MD   metFORMIN (GLUCOPHAGE) 850 MG tablet TAKE 1 TABLET TWICE DAILY  WITH MEALS Yes Rusty Alvarez MD   Insulin Pen Needle (BD PEN NEEDLE CIELO U/F) 32G X 4 MM MISC USE AND DISCARD 1 PEN      NEEDLE DAILY Yes Rusty Alvarez MD   glipiZIDE (GLUCOTROL) 10 MG tablet TAKE 1 TABLET BEFORE       BREAKFAST AND 3 TABLETS    BEFORE SUPPERTAKE 1 TABLET BEFORE       BREAKFAST AND 2 TABLETS    BEFORE SUPPER Yes Rusty Alvarez MD   TRUE METRIX BLOOD GLUCOSE TEST strip TEST TWICE DAILY Yes Rusty Alvarez MD   INSULIN SYRINGE .5CC/29G (AIMSCO INSULIN SYR ULTRA THIN) 29G X 1/2\" 0.5 ML MISC 1 each by Does not apply route daily Yes Wen Finnegan MD   aspirin 81 MG EC tablet Take 1 tablet by mouth daily Yes Wen Finnegan MD   Lancets MISC 1 each by Does not apply route daily Yes Wen Finnegan MD   Blood Glucose Monitoring Suppl BHANU Disp 1 for diabetes Yes Wen Finnegan MD   ACCU-CHEK SOFTCLIX LANCETS MISC Twice daily sugars check Yes Wne Finnegan MD       [facility] (Including outside providers/suppliers regularly involved in providing care):   Patient Care Team:  Rusty Alvarez MD as PCP - General (Internal Medicine)  Rusty Alvarez MD as PCP - Empaneled Provider  Kingsley Graf MD (Urology)  Nestor Bhakta MD as Consulting Physician (Gynecological Oncology)  María Rossi MD as Consulting Physician (Gynecological Oncology)  Pippa Mathur APRN - [facility] as Nurse

## 2024-06-05 NOTE — PATIENT INSTRUCTIONS
Preventive Plan for Uzma Downing - 6/5/2024  Medicare offers a range of preventive health benefits. Some of the tests and screenings are paid in full while other may be subject to a deductible, co-insurance, and/or copay.    Some of these benefits include a comprehensive review of your medical history including lifestyle, illnesses that may run in your family, and various assessments and screenings as appropriate.    After reviewing your medical record and screening and assessments performed today your provider may have ordered immunizations, labs, imaging, and/or referrals for you.  A list of these orders (if applicable) as well as your Preventive Care list are included within your After Visit Summary for your review.    Other Preventive Recommendations:    A preventive eye exam performed by an eye specialist is recommended every 1-2 years to screen for glaucoma; cataracts, macular degeneration, and other eye disorders.  A preventive dental visit is recommended every 6 months.  Try to get at least 150 minutes of exercise per week or 10,000 steps per day on a pedometer .  Order or download the FREE \"Exercise & Physical Activity: Your Everyday Guide\" from [employer]. Call 1-807.871.9800 or search [employer] online.  You need 1808-1732 mg of calcium and 8359-8215 IU of vitamin D per day. It is possible to meet your calcium requirement with diet alone, but a vitamin D supplement is usually necessary to meet this goal.  When exposed to the sun, use a sunscreen that protects against both UVA and UVB radiation with an SPF of 30 or greater. Reapply every 2 to 3 hours or after sweating, drying off with a towel, or swimming.  Always wear a seat belt when traveling in a car. Always wear a helmet when riding a bicycle or motorcycle.

## 2024-06-20 DIAGNOSIS — E11.65 UNCONTROLLED TYPE 2 DIABETES MELLITUS WITH HYPERGLYCEMIA (HCC): ICD-10-CM

## 2024-06-21 NOTE — TELEPHONE ENCOUNTER
Future Appointments   Date Time Provider Department Center   11/21/2024 10:30 AM Rusty Alvarez MD [facility] [place] - [facility]       Last appt 6/5/24

## 2024-08-19 DIAGNOSIS — Z79.4 TYPE 2 DIABETES MELLITUS WITHOUT COMPLICATION, WITH LONG-TERM CURRENT USE OF INSULIN (HCC): ICD-10-CM

## 2024-08-19 DIAGNOSIS — E11.9 TYPE 2 DIABETES MELLITUS WITHOUT COMPLICATION, WITH LONG-TERM CURRENT USE OF INSULIN (HCC): ICD-10-CM

## 2024-08-19 RX ORDER — CALCIUM CITRATE/VITAMIN D3 200MG-6.25
TABLET ORAL
Qty: 100 STRIP | Refills: 3 | Status: SHIPPED | OUTPATIENT
Start: 2024-08-19

## 2024-08-19 NOTE — TELEPHONE ENCOUNTER
Future Appointments   Date Time Provider Department Center   11/21/2024 10:30 AM Rusty Alvarze MD [facility] DEP       Last appt 6/5/24

## 2024-09-21 DIAGNOSIS — E11.65 UNCONTROLLED TYPE 2 DIABETES MELLITUS WITH HYPERGLYCEMIA (HCC): ICD-10-CM

## 2024-09-24 RX ORDER — PEN NEEDLE, DIABETIC 32GX 5/32"
NEEDLE, DISPOSABLE MISCELLANEOUS
Qty: 90 EACH | Refills: 3 | Status: SHIPPED | OUTPATIENT
Start: 2024-09-24

## 2024-10-17 ENCOUNTER — HOSPITAL ENCOUNTER (OUTPATIENT)
Dept: WOMENS IMAGING | Age: 79
Discharge: HOME | End: 2024-10-17
Payer: MEDICARE

## 2024-10-17 VITALS — BODY MASS INDEX: 30.22 KG/M2 | WEIGHT: 177 LBS | HEIGHT: 64 IN

## 2024-10-17 DIAGNOSIS — Z12.31 VISIT FOR SCREENING MAMMOGRAM: ICD-10-CM

## 2024-10-17 PROCEDURE — 77063 BREAST TOMOSYNTHESIS BI: CPT

## 2024-11-21 ENCOUNTER — OFFICE VISIT (OUTPATIENT)
Dept: INTERNAL MEDICINE CLINIC | Age: 79
End: 2024-11-21

## 2024-11-21 VITALS
BODY MASS INDEX: 30.21 KG/M2 | WEIGHT: 176 LBS | HEART RATE: 62 BPM | DIASTOLIC BLOOD PRESSURE: 54 MMHG | SYSTOLIC BLOOD PRESSURE: 130 MMHG

## 2024-11-21 DIAGNOSIS — E11.65 TYPE 2 DIABETES MELLITUS WITH HYPERGLYCEMIA, WITH LONG-TERM CURRENT USE OF INSULIN (HCC): ICD-10-CM

## 2024-11-21 DIAGNOSIS — E11.22 TYPE 2 DIABETES MELLITUS WITH STAGE 3A CHRONIC KIDNEY DISEASE, WITH LONG-TERM CURRENT USE OF INSULIN (HCC): ICD-10-CM

## 2024-11-21 DIAGNOSIS — Z79.4 TYPE 2 DIABETES MELLITUS WITH STAGE 3A CHRONIC KIDNEY DISEASE, WITH LONG-TERM CURRENT USE OF INSULIN (HCC): ICD-10-CM

## 2024-11-21 DIAGNOSIS — Z85.42 HISTORY OF UTERINE CANCER: ICD-10-CM

## 2024-11-21 DIAGNOSIS — E11.65 UNCONTROLLED TYPE 2 DIABETES MELLITUS WITH HYPERGLYCEMIA (HCC): ICD-10-CM

## 2024-11-21 DIAGNOSIS — I10 ESSENTIAL HYPERTENSION: Primary | ICD-10-CM

## 2024-11-21 DIAGNOSIS — N18.31 TYPE 2 DIABETES MELLITUS WITH STAGE 3A CHRONIC KIDNEY DISEASE, WITH LONG-TERM CURRENT USE OF INSULIN (HCC): ICD-10-CM

## 2024-11-21 DIAGNOSIS — Z79.4 TYPE 2 DIABETES MELLITUS WITH HYPERGLYCEMIA, WITH LONG-TERM CURRENT USE OF INSULIN (HCC): ICD-10-CM

## 2024-11-21 LAB
ALBUMIN SERPL-MCNC: 4.3 G/DL (ref 3.4–5)
ALBUMIN/GLOB SERPL: 1.3 {RATIO} (ref 1.1–2.2)
ALP SERPL-CCNC: 63 U/L (ref 40–129)
ALT SERPL-CCNC: 19 U/L (ref 10–40)
ANION GAP SERPL CALCULATED.3IONS-SCNC: 15 MMOL/L (ref 3–16)
AST SERPL-CCNC: 23 U/L (ref 15–37)
BASOPHILS # BLD: 0 K/UL (ref 0–0.2)
BASOPHILS NFR BLD: 0.5 %
BILIRUB SERPL-MCNC: 0.5 MG/DL (ref 0–1)
BUN SERPL-MCNC: 18 MG/DL (ref 7–20)
CALCIUM SERPL-MCNC: 10 MG/DL (ref 8.3–10.6)
CHLORIDE SERPL-SCNC: 102 MMOL/L (ref 99–110)
CHOLEST SERPL-MCNC: 163 MG/DL (ref 0–199)
CO2 SERPL-SCNC: 23 MMOL/L (ref 21–32)
CREAT SERPL-MCNC: 1.1 MG/DL (ref 0.6–1.2)
DEPRECATED RDW RBC AUTO: 14.9 % (ref 12.4–15.4)
EOSINOPHIL # BLD: 0.2 K/UL (ref 0–0.6)
EOSINOPHIL NFR BLD: 1.7 %
GFR SERPLBLD CREATININE-BSD FMLA CKD-EPI: 51 ML/MIN/{1.73_M2}
GLUCOSE SERPL-MCNC: 139 MG/DL (ref 70–99)
HBA1C MFR BLD: 7.5 %
HCT VFR BLD AUTO: 39.9 % (ref 36–48)
HDLC SERPL-MCNC: 34 MG/DL (ref 40–60)
HGB BLD-MCNC: 13 G/DL (ref 12–16)
LDLC SERPL CALC-MCNC: 94 MG/DL
LYMPHOCYTES # BLD: 2.2 K/UL (ref 1–5.1)
LYMPHOCYTES NFR BLD: 23.7 %
MCH RBC QN AUTO: 27.4 PG (ref 26–34)
MCHC RBC AUTO-ENTMCNC: 32.6 G/DL (ref 31–36)
MCV RBC AUTO: 83.8 FL (ref 80–100)
MONOCYTES # BLD: 0.6 K/UL (ref 0–1.3)
MONOCYTES NFR BLD: 6.6 %
NEUTROPHILS # BLD: 6.2 K/UL (ref 1.7–7.7)
NEUTROPHILS NFR BLD: 67.5 %
PLATELET # BLD AUTO: 340 K/UL (ref 135–450)
PMV BLD AUTO: 9.4 FL (ref 5–10.5)
POTASSIUM SERPL-SCNC: 5.3 MMOL/L (ref 3.5–5.1)
PROT SERPL-MCNC: 7.5 G/DL (ref 6.4–8.2)
RBC # BLD AUTO: 4.77 M/UL (ref 4–5.2)
SODIUM SERPL-SCNC: 140 MMOL/L (ref 136–145)
TRIGL SERPL-MCNC: 175 MG/DL (ref 0–150)
TSH SERPL DL<=0.005 MIU/L-ACNC: 1.74 UIU/ML (ref 0.27–4.2)
VLDLC SERPL CALC-MCNC: 35 MG/DL
WBC # BLD AUTO: 9.1 K/UL (ref 4–11)

## 2024-11-21 RX ORDER — INSULIN GLARGINE 100 [IU]/ML
15 INJECTION, SOLUTION SUBCUTANEOUS NIGHTLY
Qty: 45 ML | Refills: 3 | Status: SHIPPED | OUTPATIENT
Start: 2024-11-21

## 2024-11-21 RX ORDER — GLIPIZIDE 10 MG/1
10 TABLET ORAL
Qty: 180 TABLET | Refills: 3 | Status: SHIPPED | OUTPATIENT
Start: 2024-11-21

## 2024-11-21 RX ORDER — ACYCLOVIR 400 MG/1
1 TABLET ORAL
Qty: 3 EACH | Refills: 5 | Status: SHIPPED | OUTPATIENT
Start: 2024-11-21 | End: 2024-12-03 | Stop reason: SDUPTHER
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
## 2024-11-25 ENCOUNTER — TELEPHONE (OUTPATIENT)
Dept: INTERNAL MEDICINE CLINIC | Age: 79
End: 2024-11-25

## 2024-11-25 NOTE — TELEPHONE ENCOUNTER
Called [facility] pharmacy DEXCOM G7 is not covered.      Please advise

## 2024-11-27 NOTE — TELEPHONE ENCOUNTER
Called pt told her to come to office to get some dexcom and told her to ask her insurance which one they will cover.

## 2024-12-03 RX ORDER — ACYCLOVIR 400 MG/1
TABLET ORAL
COMMUNITY
End: 2024-12-03 | Stop reason: SDUPTHER

## 2024-12-03 RX ORDER — ACYCLOVIR 400 MG/1
TABLET ORAL
Qty: 1 EACH | Refills: 0 | Status: SHIPPED | OUTPATIENT
Start: 2024-12-03

## 2024-12-03 RX ORDER — ACYCLOVIR 400 MG/1
1 TABLET ORAL
Qty: 3 EACH | Refills: 5 | Status: SHIPPED | OUTPATIENT
Start: 2024-12-03

## 2024-12-03 NOTE — TELEPHONE ENCOUNTER
Pt called, can you please resend her Rx for Continuous Glucose Sensor (DEXCOM G7 SENSOR) to [facility] in [place]    Thank you

## 2024-12-03 NOTE — TELEPHONE ENCOUNTER
Medication:   Requested Prescriptions     Pending Prescriptions Disp Refills    Continuous Glucose Sensor (DEXCOM G7 SENSOR) MISC 3 each 5     Si each by Does not apply route every 10 days    Continuous Glucose  (DEXCOM G7 ) BHANU       Sig: by Does not apply route       Last Filled:      Patient Phone Number: [phone] (home)     Last appt: 2024   Next appt: 3/10/2025Medication:

## 2024-12-14 NOTE — ASSESSMENT & PLAN NOTE
Orders:    insulin glargine (BASAGLAR KWIKPEN) 100 UNIT/ML injection pen; Inject 15 Units into the skin nightly

## 2025-01-27 DIAGNOSIS — E11.65 UNCONTROLLED TYPE 2 DIABETES MELLITUS WITH HYPERGLYCEMIA (HCC): ICD-10-CM

## 2025-01-27 NOTE — TELEPHONE ENCOUNTER
Future Appointments   Date Time Provider Department Center   3/11/2025  1:30 PM Rusty Alvarez MD [facility] E[facility] DEP       Last appt 11/21/24

## 2025-03-11 ENCOUNTER — OFFICE VISIT (OUTPATIENT)
Dept: INTERNAL MEDICINE CLINIC | Age: 80
End: 2025-03-11

## 2025-03-11 VITALS
OXYGEN SATURATION: 92 % | BODY MASS INDEX: 29.87 KG/M2 | DIASTOLIC BLOOD PRESSURE: 70 MMHG | HEART RATE: 82 BPM | WEIGHT: 174 LBS | SYSTOLIC BLOOD PRESSURE: 120 MMHG

## 2025-03-11 DIAGNOSIS — N18.31 TYPE 2 DIABETES MELLITUS WITH STAGE 3A CHRONIC KIDNEY DISEASE, WITH LONG-TERM CURRENT USE OF INSULIN (HCC): Primary | ICD-10-CM

## 2025-03-11 DIAGNOSIS — I10 ESSENTIAL HYPERTENSION: ICD-10-CM

## 2025-03-11 DIAGNOSIS — Z79.4 TYPE 2 DIABETES MELLITUS WITH STAGE 3A CHRONIC KIDNEY DISEASE, WITH LONG-TERM CURRENT USE OF INSULIN (HCC): Primary | ICD-10-CM

## 2025-03-11 DIAGNOSIS — E11.22 TYPE 2 DIABETES MELLITUS WITH STAGE 3A CHRONIC KIDNEY DISEASE, WITH LONG-TERM CURRENT USE OF INSULIN (HCC): Primary | ICD-10-CM

## 2025-03-11 LAB — HBA1C MFR BLD: 7.6 %

## 2025-03-11 RX ORDER — INSULIN GLARGINE 100 [IU]/ML
25 INJECTION, SOLUTION SUBCUTANEOUS NIGHTLY
Qty: 15 ADJUSTABLE DOSE PRE-FILLED PEN SYRINGE | Refills: 3 | Status: SHIPPED | OUTPATIENT
Start: 2025-03-11

## 2025-03-11 SDOH — ECONOMIC STABILITY: FOOD INSECURITY: WITHIN THE PAST 12 MONTHS, THE FOOD YOU BOUGHT JUST DIDN'T LAST AND YOU DIDN'T HAVE MONEY TO GET MORE.: NEVER TRUE

## 2025-03-11 SDOH — ECONOMIC STABILITY: FOOD INSECURITY: WITHIN THE PAST 12 MONTHS, YOU WORRIED THAT YOUR FOOD WOULD RUN OUT BEFORE YOU GOT MONEY TO BUY MORE.: NEVER TRUE

## 2025-03-11 ASSESSMENT — PATIENT HEALTH QUESTIONNAIRE - PHQ9
2. FEELING DOWN, DEPRESSED OR HOPELESS: NOT AT ALL
SUM OF ALL RESPONSES TO PHQ QUESTIONS 1-9: 0
1. LITTLE INTEREST OR PLEASURE IN DOING THINGS: NOT AT ALL

## 2025-03-11 NOTE — PROGRESS NOTES
bilaterally  Skin: Dry, warm. No obvious skin lesions or rashes noted.  Neuro: PEERL. EOM intact. No facial droop. Normal sensation in bilateral upper and lower extremities. Gait is normal    Assessment and Plan  Uzma Downingpresents to clinic for follow-up on chronic conditions listed above below and discussion of preventative care recommendations.       Assessment & Plan  Type 2 diabetes mellitus with stage 3a chronic kidney disease, with long-term current use of insulin (HCC)   T2DM: Patient with history of type 2  diabetes.Patient's last a1c was   Hemoglobin A1C   Date Value Ref Range Status   03/11/2025 7.6 % Final   . Patient is compliant with   Key Antihyperglycemic Medications              insulin glargine (BASAGLAR KWIKPEN) 100 UNIT/ML injection pen (Taking) Inject 25 Units into the skin nightly    metFORMIN (GLUCOPHAGE) 850 MG tablet (Taking) TAKE 1 TABLET TWICE DAILY  WITH MEALS    glipiZIDE (GLUCOTROL) 10 MG tablet (Taking) Take 1 tablet by mouth 2 times daily (before meals)        . Patient does not monitor blood sugars at home. Patient does not have neuropathy, nephropathy, or history of vascular disorder.      Orders:    POCT glycosylated hemoglobin (Hb A1C)    Essential hypertension   Blood pressure is well controlled.

## 2025-03-11 NOTE — ASSESSMENT & PLAN NOTE
T2DM: Patient with history of type 2  diabetes.Patient's last a1c was   Hemoglobin A1C   Date Value Ref Range Status   03/11/2025 7.6 % Final   . Patient is compliant with   Key Antihyperglycemic Medications              insulin glargine (BASAGLAR KWIKPEN) 100 UNIT/ML injection pen (Taking) Inject 25 Units into the skin nightly    metFORMIN (GLUCOPHAGE) 850 MG tablet (Taking) TAKE 1 TABLET TWICE DAILY  WITH MEALS    glipiZIDE (GLUCOTROL) 10 MG tablet (Taking) Take 1 tablet by mouth 2 times daily (before meals)        . Patient does not monitor blood sugars at home. Patient does not have neuropathy, nephropathy, or history of vascular disorder.      Orders:    POCT glycosylated hemoglobin (Hb A1C)

## 2025-03-28 DIAGNOSIS — Z79.4 TYPE 2 DIABETES MELLITUS WITHOUT COMPLICATION, WITH LONG-TERM CURRENT USE OF INSULIN: ICD-10-CM

## 2025-03-28 DIAGNOSIS — E11.9 TYPE 2 DIABETES MELLITUS WITHOUT COMPLICATION, WITH LONG-TERM CURRENT USE OF INSULIN: ICD-10-CM

## 2025-03-28 NOTE — TELEPHONE ENCOUNTER
Future Appointments   Date Time Provider Department Center   9/16/2025  1:00 PM Rusty Alvarez MD [facility] ECC DEP

## 2025-03-30 RX ORDER — CALCIUM CITRATE/VITAMIN D3 200MG-6.25
TABLET ORAL
Qty: 100 STRIP | Refills: 3 | Status: SHIPPED | OUTPATIENT
Start: 2025-03-30

## 2025-04-14 DIAGNOSIS — E78.00 PURE HYPERCHOLESTEROLEMIA: ICD-10-CM

## 2025-04-14 RX ORDER — ATORVASTATIN CALCIUM 10 MG/1
TABLET, FILM COATED ORAL
Qty: 90 TABLET | Refills: 3 | Status: SHIPPED | OUTPATIENT
Start: 2025-04-14

## 2025-04-14 NOTE — TELEPHONE ENCOUNTER
Future Appointments   Date Time Provider Department Center   9/16/2025  1:00 PM Rusty Alvarez MD [facility] E[facility] DEP

## 2025-07-21 DIAGNOSIS — I10 ESSENTIAL HYPERTENSION: ICD-10-CM

## 2025-07-21 RX ORDER — LOSARTAN POTASSIUM 50 MG/1
TABLET ORAL
Qty: 180 TABLET | Refills: 3 | Status: SHIPPED | OUTPATIENT
Start: 2025-07-21

## 2025-07-21 RX ORDER — METOPROLOL TARTRATE 50 MG
50 TABLET ORAL 2 TIMES DAILY
Qty: 180 TABLET | Refills: 3 | Status: SHIPPED | OUTPATIENT
Start: 2025-07-21

## 2025-07-21 NOTE — TELEPHONE ENCOUNTER
Future Appointments   Date Time Provider Department Center   9/16/2025  1:00 PM Rusty Alvarez MD [facility] E[facility] DEP       Last appt 3/11/25

## 2025-07-23 DIAGNOSIS — E11.65 UNCONTROLLED TYPE 2 DIABETES MELLITUS WITH HYPERGLYCEMIA (HCC): ICD-10-CM

## 2025-07-23 NOTE — TELEPHONE ENCOUNTER
Future Appointments   Date Time Provider Department Center   9/16/2025  1:00 PM Rusty Alvarez MD [facility] E[facility] DEP

## 2025-09-04 DIAGNOSIS — E11.65 UNCONTROLLED TYPE 2 DIABETES MELLITUS WITH HYPERGLYCEMIA (HCC): ICD-10-CM

## 2025-09-04 RX ORDER — PEN NEEDLE, DIABETIC 32GX 5/32"
NEEDLE, DISPOSABLE MISCELLANEOUS
Qty: 90 EACH | Refills: 3 | Status: SHIPPED | OUTPATIENT
Start: 2025-09-04